# Patient Record
Sex: MALE | Race: WHITE | NOT HISPANIC OR LATINO | Employment: UNEMPLOYED | URBAN - METROPOLITAN AREA
[De-identification: names, ages, dates, MRNs, and addresses within clinical notes are randomized per-mention and may not be internally consistent; named-entity substitution may affect disease eponyms.]

---

## 2017-02-14 ENCOUNTER — ALLSCRIPTS OFFICE VISIT (OUTPATIENT)
Dept: OTHER | Facility: OTHER | Age: 82
End: 2017-02-14

## 2017-02-16 ENCOUNTER — ALLSCRIPTS OFFICE VISIT (OUTPATIENT)
Dept: OTHER | Facility: OTHER | Age: 82
End: 2017-02-16

## 2017-02-16 ENCOUNTER — GENERIC CONVERSION - ENCOUNTER (OUTPATIENT)
Dept: OTHER | Facility: OTHER | Age: 82
End: 2017-02-16

## 2017-03-16 ENCOUNTER — ALLSCRIPTS OFFICE VISIT (OUTPATIENT)
Dept: OTHER | Facility: OTHER | Age: 82
End: 2017-03-16

## 2017-03-16 DIAGNOSIS — R74.8 ABNORMAL LEVELS OF OTHER SERUM ENZYMES: ICD-10-CM

## 2017-03-16 DIAGNOSIS — B17.9 ACUTE VIRAL HEPATITIS: ICD-10-CM

## 2017-05-04 ENCOUNTER — ALLSCRIPTS OFFICE VISIT (OUTPATIENT)
Dept: OTHER | Facility: OTHER | Age: 82
End: 2017-05-04

## 2017-06-08 ENCOUNTER — ALLSCRIPTS OFFICE VISIT (OUTPATIENT)
Dept: OTHER | Facility: OTHER | Age: 82
End: 2017-06-08

## 2017-06-16 DIAGNOSIS — N18.4 CHRONIC KIDNEY DISEASE, STAGE IV (SEVERE) (HCC): ICD-10-CM

## 2017-06-20 ENCOUNTER — GENERIC CONVERSION - ENCOUNTER (OUTPATIENT)
Dept: OTHER | Facility: OTHER | Age: 82
End: 2017-06-20

## 2017-08-03 ENCOUNTER — ALLSCRIPTS OFFICE VISIT (OUTPATIENT)
Dept: OTHER | Facility: OTHER | Age: 82
End: 2017-08-03

## 2017-09-07 ENCOUNTER — ALLSCRIPTS OFFICE VISIT (OUTPATIENT)
Dept: OTHER | Facility: OTHER | Age: 82
End: 2017-09-07

## 2017-09-25 ENCOUNTER — HOSPITAL ENCOUNTER (INPATIENT)
Facility: HOSPITAL | Age: 82
LOS: 4 days | Discharge: NURSING, REHAB OR LONG-TERM CARE | DRG: 245 | End: 2017-09-30
Attending: EMERGENCY MEDICINE | Admitting: INTERNAL MEDICINE
Payer: MEDICARE

## 2017-09-25 ENCOUNTER — ALLSCRIPTS OFFICE VISIT (OUTPATIENT)
Dept: OTHER | Facility: OTHER | Age: 82
End: 2017-09-25

## 2017-09-25 ENCOUNTER — APPOINTMENT (EMERGENCY)
Dept: RADIOLOGY | Facility: HOSPITAL | Age: 82
DRG: 245 | End: 2017-09-25
Payer: MEDICARE

## 2017-09-25 DIAGNOSIS — I50.9 ACUTE EXACERBATION OF CHF (CONGESTIVE HEART FAILURE) (HCC): Primary | ICD-10-CM

## 2017-09-25 DIAGNOSIS — T82.111A: ICD-10-CM

## 2017-09-25 LAB
ALBUMIN SERPL BCP-MCNC: 2.9 G/DL (ref 3.5–5)
ALP SERPL-CCNC: 126 U/L (ref 46–116)
ALT SERPL W P-5'-P-CCNC: 73 U/L (ref 12–78)
ANION GAP SERPL CALCULATED.3IONS-SCNC: 9 MMOL/L (ref 4–13)
AST SERPL W P-5'-P-CCNC: 79 U/L (ref 5–45)
BASOPHILS # BLD AUTO: 0.03 THOUSANDS/ΜL (ref 0–0.1)
BASOPHILS NFR BLD AUTO: 1 % (ref 0–1)
BILIRUB SERPL-MCNC: 0.64 MG/DL (ref 0.2–1)
BUN SERPL-MCNC: 30 MG/DL (ref 5–25)
CALCIUM SERPL-MCNC: 9.4 MG/DL (ref 8.3–10.1)
CHLORIDE SERPL-SCNC: 108 MMOL/L (ref 100–108)
CO2 SERPL-SCNC: 22 MMOL/L (ref 21–32)
CREAT SERPL-MCNC: 1.51 MG/DL (ref 0.6–1.3)
EOSINOPHIL # BLD AUTO: 0.16 THOUSAND/ΜL (ref 0–0.61)
EOSINOPHIL NFR BLD AUTO: 3 % (ref 0–6)
ERYTHROCYTE [DISTWIDTH] IN BLOOD BY AUTOMATED COUNT: 14.5 % (ref 11.6–15.1)
GFR SERPL CREATININE-BSD FRML MDRD: 41 ML/MIN/1.73SQ M
GLUCOSE SERPL-MCNC: 94 MG/DL (ref 65–140)
HCT VFR BLD AUTO: 37.2 % (ref 36.5–49.3)
HGB BLD-MCNC: 12.2 G/DL (ref 12–17)
LYMPHOCYTES # BLD AUTO: 1.68 THOUSANDS/ΜL (ref 0.6–4.47)
LYMPHOCYTES NFR BLD AUTO: 31 % (ref 14–44)
MCH RBC QN AUTO: 32.5 PG (ref 26.8–34.3)
MCHC RBC AUTO-ENTMCNC: 32.8 G/DL (ref 31.4–37.4)
MCV RBC AUTO: 99 FL (ref 82–98)
MONOCYTES # BLD AUTO: 0.58 THOUSAND/ΜL (ref 0.17–1.22)
MONOCYTES NFR BLD AUTO: 11 % (ref 4–12)
NEUTROPHILS # BLD AUTO: 2.99 THOUSANDS/ΜL (ref 1.85–7.62)
NEUTS SEG NFR BLD AUTO: 54 % (ref 43–75)
NRBC BLD AUTO-RTO: 0 /100 WBCS
NT-PROBNP SERPL-MCNC: 1661 PG/ML
PLATELET # BLD AUTO: 253 THOUSANDS/UL (ref 149–390)
PMV BLD AUTO: 10.7 FL (ref 8.9–12.7)
POTASSIUM SERPL-SCNC: 4.6 MMOL/L (ref 3.5–5.3)
PROT SERPL-MCNC: 7.4 G/DL (ref 6.4–8.2)
RBC # BLD AUTO: 3.75 MILLION/UL (ref 3.88–5.62)
SODIUM SERPL-SCNC: 139 MMOL/L (ref 136–145)
TROPONIN I SERPL-MCNC: 0.02 NG/ML
WBC # BLD AUTO: 5.45 THOUSAND/UL (ref 4.31–10.16)

## 2017-09-25 PROCEDURE — 93005 ELECTROCARDIOGRAM TRACING: CPT | Performed by: EMERGENCY MEDICINE

## 2017-09-25 PROCEDURE — 96374 THER/PROPH/DIAG INJ IV PUSH: CPT

## 2017-09-25 PROCEDURE — 83880 ASSAY OF NATRIURETIC PEPTIDE: CPT | Performed by: EMERGENCY MEDICINE

## 2017-09-25 PROCEDURE — 84484 ASSAY OF TROPONIN QUANT: CPT | Performed by: EMERGENCY MEDICINE

## 2017-09-25 PROCEDURE — 36415 COLL VENOUS BLD VENIPUNCTURE: CPT

## 2017-09-25 PROCEDURE — 71020 HB CHEST X-RAY 2VW FRONTAL&LATL: CPT | Performed by: EMERGENCY MEDICINE

## 2017-09-25 PROCEDURE — 80053 COMPREHEN METABOLIC PANEL: CPT | Performed by: EMERGENCY MEDICINE

## 2017-09-25 PROCEDURE — 85025 COMPLETE CBC W/AUTO DIFF WBC: CPT | Performed by: EMERGENCY MEDICINE

## 2017-09-25 PROCEDURE — 99285 EMERGENCY DEPT VISIT HI MDM: CPT

## 2017-09-25 RX ORDER — ACETAMINOPHEN 325 MG/1
325 TABLET ORAL EVERY 4 HOURS PRN
Status: DISCONTINUED | OUTPATIENT
Start: 2017-09-25 | End: 2017-09-30 | Stop reason: HOSPADM

## 2017-09-25 RX ORDER — ATORVASTATIN CALCIUM 40 MG/1
40 TABLET, FILM COATED ORAL DAILY
Status: DISCONTINUED | OUTPATIENT
Start: 2017-09-26 | End: 2017-09-30 | Stop reason: HOSPADM

## 2017-09-25 RX ORDER — RAMIPRIL 5 MG/1
5 CAPSULE ORAL DAILY
Status: DISCONTINUED | OUTPATIENT
Start: 2017-09-26 | End: 2017-09-30 | Stop reason: HOSPADM

## 2017-09-25 RX ORDER — POTASSIUM CHLORIDE 750 MG/1
40 TABLET, EXTENDED RELEASE ORAL DAILY
Status: DISCONTINUED | OUTPATIENT
Start: 2017-09-26 | End: 2017-09-30 | Stop reason: HOSPADM

## 2017-09-25 RX ORDER — ALPRAZOLAM 0.5 MG/1
0.5 TABLET ORAL
Status: DISCONTINUED | OUTPATIENT
Start: 2017-09-26 | End: 2017-09-30 | Stop reason: HOSPADM

## 2017-09-25 RX ORDER — AMIODARONE HYDROCHLORIDE 200 MG/1
200 TABLET ORAL 2 TIMES DAILY
Status: DISCONTINUED | OUTPATIENT
Start: 2017-09-26 | End: 2017-09-30 | Stop reason: HOSPADM

## 2017-09-25 RX ORDER — ALBUTEROL SULFATE 2.5 MG/3ML
2.5 SOLUTION RESPIRATORY (INHALATION) EVERY 4 HOURS PRN
Status: DISCONTINUED | OUTPATIENT
Start: 2017-09-25 | End: 2017-09-26

## 2017-09-25 RX ORDER — ALLOPURINOL 100 MG/1
100 TABLET ORAL DAILY
Status: DISCONTINUED | OUTPATIENT
Start: 2017-09-26 | End: 2017-09-30 | Stop reason: HOSPADM

## 2017-09-25 RX ORDER — CARVEDILOL 3.12 MG/1
3.12 TABLET ORAL 2 TIMES DAILY WITH MEALS
Status: DISCONTINUED | OUTPATIENT
Start: 2017-09-26 | End: 2017-09-30 | Stop reason: HOSPADM

## 2017-09-25 RX ORDER — FUROSEMIDE 10 MG/ML
20 INJECTION INTRAMUSCULAR; INTRAVENOUS ONCE
Status: COMPLETED | OUTPATIENT
Start: 2017-09-25 | End: 2017-09-25

## 2017-09-25 RX ORDER — LANOLIN ALCOHOL/MO/W.PET/CERES
12 CREAM (GRAM) TOPICAL
Status: DISCONTINUED | OUTPATIENT
Start: 2017-09-26 | End: 2017-09-30 | Stop reason: HOSPADM

## 2017-09-25 RX ORDER — RANOLAZINE 500 MG/1
500 TABLET, EXTENDED RELEASE ORAL 2 TIMES DAILY
Status: DISCONTINUED | OUTPATIENT
Start: 2017-09-26 | End: 2017-09-30 | Stop reason: HOSPADM

## 2017-09-25 RX ORDER — FUROSEMIDE 10 MG/ML
40 INJECTION INTRAMUSCULAR; INTRAVENOUS
Status: DISCONTINUED | OUTPATIENT
Start: 2017-09-26 | End: 2017-09-30 | Stop reason: HOSPADM

## 2017-09-25 RX ORDER — ASPIRIN 81 MG/1
81 TABLET, CHEWABLE ORAL DAILY
Status: DISCONTINUED | OUTPATIENT
Start: 2017-09-26 | End: 2017-09-30 | Stop reason: HOSPADM

## 2017-09-25 RX ORDER — LEVOTHYROXINE SODIUM 0.05 MG/1
50 TABLET ORAL
Status: DISCONTINUED | OUTPATIENT
Start: 2017-09-26 | End: 2017-09-30 | Stop reason: HOSPADM

## 2017-09-25 RX ORDER — PANTOPRAZOLE SODIUM 20 MG/1
20 TABLET, DELAYED RELEASE ORAL
Status: DISCONTINUED | OUTPATIENT
Start: 2017-09-26 | End: 2017-09-30 | Stop reason: HOSPADM

## 2017-09-25 RX ADMIN — FUROSEMIDE 20 MG: 10 INJECTION, SOLUTION INTRAMUSCULAR; INTRAVENOUS at 21:56

## 2017-09-26 PROBLEM — N17.9 AKI (ACUTE KIDNEY INJURY) (HCC): Status: RESOLVED | Noted: 2017-09-26 | Resolved: 2017-09-26

## 2017-09-26 PROBLEM — N17.9 AKI (ACUTE KIDNEY INJURY) (HCC): Status: ACTIVE | Noted: 2017-09-26

## 2017-09-26 PROBLEM — I25.10 CAD (CORONARY ARTERY DISEASE): Status: ACTIVE | Noted: 2017-09-26

## 2017-09-26 PROBLEM — E78.5 HYPERLIPIDEMIA: Status: ACTIVE | Noted: 2017-09-26

## 2017-09-26 PROBLEM — E03.9 HYPOTHYROIDISM: Status: ACTIVE | Noted: 2017-09-26

## 2017-09-26 PROBLEM — I10 ESSENTIAL HYPERTENSION: Status: ACTIVE | Noted: 2017-09-26

## 2017-09-26 PROBLEM — I50.33 ACUTE ON CHRONIC DIASTOLIC (CONGESTIVE) HEART FAILURE (HCC): Status: ACTIVE | Noted: 2017-09-26

## 2017-09-26 PROBLEM — L89.151 DECUBITUS ULCER OF SACRAL REGION, STAGE 1: Status: ACTIVE | Noted: 2017-09-26

## 2017-09-26 PROBLEM — I48.0 PAROXYSMAL ATRIAL FIBRILLATION (HCC): Status: ACTIVE | Noted: 2017-09-26

## 2017-09-26 PROBLEM — I25.5 ISCHEMIC CARDIOMYOPATHY: Status: ACTIVE | Noted: 2017-09-26

## 2017-09-26 PROBLEM — I10 ESSENTIAL HYPERTENSION: Status: RESOLVED | Noted: 2017-09-26 | Resolved: 2017-09-26

## 2017-09-26 PROBLEM — N18.30 CHRONIC KIDNEY DISEASE (CKD), STAGE III (MODERATE) (HCC): Status: ACTIVE | Noted: 2017-09-26

## 2017-09-26 PROBLEM — N18.30 CHRONIC KIDNEY DISEASE (CKD), STAGE III (MODERATE) (HCC): Chronic | Status: ACTIVE | Noted: 2017-09-26

## 2017-09-26 LAB
ANION GAP SERPL CALCULATED.3IONS-SCNC: 8 MMOL/L (ref 4–13)
ATRIAL RATE: 31 BPM
ATRIAL RATE: 56 BPM
BUN SERPL-MCNC: 28 MG/DL (ref 5–25)
CALCIUM SERPL-MCNC: 9.2 MG/DL (ref 8.3–10.1)
CHLORIDE SERPL-SCNC: 105 MMOL/L (ref 100–108)
CO2 SERPL-SCNC: 26 MMOL/L (ref 21–32)
CREAT SERPL-MCNC: 1.44 MG/DL (ref 0.6–1.3)
ERYTHROCYTE [DISTWIDTH] IN BLOOD BY AUTOMATED COUNT: 14.2 % (ref 11.6–15.1)
GFR SERPL CREATININE-BSD FRML MDRD: 43 ML/MIN/1.73SQ M
GLUCOSE P FAST SERPL-MCNC: 91 MG/DL (ref 65–99)
GLUCOSE SERPL-MCNC: 91 MG/DL (ref 65–140)
HCT VFR BLD AUTO: 36.2 % (ref 36.5–49.3)
HGB BLD-MCNC: 11.7 G/DL (ref 12–17)
MAGNESIUM SERPL-MCNC: 2.3 MG/DL (ref 1.6–2.6)
MCH RBC QN AUTO: 31.8 PG (ref 26.8–34.3)
MCHC RBC AUTO-ENTMCNC: 32.3 G/DL (ref 31.4–37.4)
MCV RBC AUTO: 98 FL (ref 82–98)
P AXIS: -28 DEGREES
PHOSPHATE SERPL-MCNC: 2.7 MG/DL (ref 2.3–4.1)
PLATELET # BLD AUTO: 238 THOUSANDS/UL (ref 149–390)
PMV BLD AUTO: 10.9 FL (ref 8.9–12.7)
POTASSIUM SERPL-SCNC: 4.1 MMOL/L (ref 3.5–5.3)
PR INTERVAL: 106 MS
PR INTERVAL: 110 MS
QRS AXIS: 109 DEGREES
QRS AXIS: 195 DEGREES
QRSD INTERVAL: 52 MS
QRSD INTERVAL: 90 MS
QT INTERVAL: 470 MS
QT INTERVAL: 520 MS
QTC INTERVAL: 453 MS
QTC INTERVAL: 681 MS
RBC # BLD AUTO: 3.68 MILLION/UL (ref 3.88–5.62)
SODIUM SERPL-SCNC: 139 MMOL/L (ref 136–145)
T WAVE AXIS: -15 DEGREES
T WAVE AXIS: -29 DEGREES
TSH SERPL DL<=0.05 MIU/L-ACNC: 2.34 UIU/ML (ref 0.36–3.74)
VENTRICULAR RATE: 103 BPM
VENTRICULAR RATE: 56 BPM
WBC # BLD AUTO: 5.71 THOUSAND/UL (ref 4.31–10.16)

## 2017-09-26 PROCEDURE — 97167 OT EVAL HIGH COMPLEX 60 MIN: CPT

## 2017-09-26 PROCEDURE — G8978 MOBILITY CURRENT STATUS: HCPCS

## 2017-09-26 PROCEDURE — 97163 PT EVAL HIGH COMPLEX 45 MIN: CPT

## 2017-09-26 PROCEDURE — G8988 SELF CARE GOAL STATUS: HCPCS

## 2017-09-26 PROCEDURE — 80048 BASIC METABOLIC PNL TOTAL CA: CPT | Performed by: FAMILY MEDICINE

## 2017-09-26 PROCEDURE — 0JH608Z INSERTION OF DEFIBRILLATOR GENERATOR INTO CHEST SUBCUTANEOUS TISSUE AND FASCIA, OPEN APPROACH: ICD-10-PCS | Performed by: INTERNAL MEDICINE

## 2017-09-26 PROCEDURE — 85027 COMPLETE CBC AUTOMATED: CPT | Performed by: FAMILY MEDICINE

## 2017-09-26 PROCEDURE — 0JB60ZZ EXCISION OF CHEST SUBCUTANEOUS TISSUE AND FASCIA, OPEN APPROACH: ICD-10-PCS | Performed by: INTERNAL MEDICINE

## 2017-09-26 PROCEDURE — G8989 SELF CARE D/C STATUS: HCPCS

## 2017-09-26 PROCEDURE — 84443 ASSAY THYROID STIM HORMONE: CPT | Performed by: NURSE PRACTITIONER

## 2017-09-26 PROCEDURE — 94760 N-INVAS EAR/PLS OXIMETRY 1: CPT

## 2017-09-26 PROCEDURE — 84100 ASSAY OF PHOSPHORUS: CPT | Performed by: FAMILY MEDICINE

## 2017-09-26 PROCEDURE — G8979 MOBILITY GOAL STATUS: HCPCS

## 2017-09-26 PROCEDURE — 0JPT0PZ REMOVAL OF CARDIAC RHYTHM RELATED DEVICE FROM TRUNK SUBCUTANEOUS TISSUE AND FASCIA, OPEN APPROACH: ICD-10-PCS | Performed by: INTERNAL MEDICINE

## 2017-09-26 PROCEDURE — 3E0102A INTRODUCTION OF ANTI-INFECTIVE ENVELOPE INTO SUBCUTANEOUS TISSUE, OPEN APPROACH: ICD-10-PCS | Performed by: INTERNAL MEDICINE

## 2017-09-26 PROCEDURE — G8987 SELF CARE CURRENT STATUS: HCPCS

## 2017-09-26 PROCEDURE — 93005 ELECTROCARDIOGRAM TRACING: CPT | Performed by: FAMILY MEDICINE

## 2017-09-26 PROCEDURE — 83735 ASSAY OF MAGNESIUM: CPT | Performed by: FAMILY MEDICINE

## 2017-09-26 RX ADMIN — RANOLAZINE 500 MG: 500 TABLET, FILM COATED, EXTENDED RELEASE ORAL at 16:53

## 2017-09-26 RX ADMIN — FUROSEMIDE 40 MG: 10 INJECTION, SOLUTION INTRAMUSCULAR; INTRAVENOUS at 08:57

## 2017-09-26 RX ADMIN — RAMIPRIL 5 MG: 5 CAPSULE ORAL at 08:59

## 2017-09-26 RX ADMIN — FUROSEMIDE 40 MG: 10 INJECTION, SOLUTION INTRAMUSCULAR; INTRAVENOUS at 16:52

## 2017-09-26 RX ADMIN — ALPRAZOLAM 0.5 MG: 0.5 TABLET ORAL at 21:25

## 2017-09-26 RX ADMIN — ALLOPURINOL 100 MG: 100 TABLET ORAL at 08:58

## 2017-09-26 RX ADMIN — LEVOTHYROXINE SODIUM 50 MCG: 88 TABLET ORAL at 05:49

## 2017-09-26 RX ADMIN — CARVEDILOL 3.12 MG: 3.12 TABLET, FILM COATED ORAL at 16:53

## 2017-09-26 RX ADMIN — MELATONIN TAB 3 MG 12 MG: 3 TAB at 21:25

## 2017-09-26 RX ADMIN — POTASSIUM CHLORIDE 40 MEQ: 750 TABLET, EXTENDED RELEASE ORAL at 08:58

## 2017-09-26 RX ADMIN — ENOXAPARIN SODIUM 40 MG: 40 INJECTION SUBCUTANEOUS at 08:57

## 2017-09-26 RX ADMIN — ASPIRIN 81 MG 81 MG: 81 TABLET ORAL at 08:58

## 2017-09-26 RX ADMIN — AMIODARONE HYDROCHLORIDE 200 MG: 200 TABLET ORAL at 08:58

## 2017-09-26 RX ADMIN — PANTOPRAZOLE SODIUM 20 MG: 20 TABLET, DELAYED RELEASE ORAL at 05:49

## 2017-09-26 RX ADMIN — AMIODARONE HYDROCHLORIDE 200 MG: 200 TABLET ORAL at 16:52

## 2017-09-26 RX ADMIN — CARVEDILOL 3.12 MG: 3.12 TABLET, FILM COATED ORAL at 08:58

## 2017-09-26 RX ADMIN — ATORVASTATIN CALCIUM 40 MG: 40 TABLET, FILM COATED ORAL at 08:58

## 2017-09-26 RX ADMIN — RANOLAZINE 500 MG: 500 TABLET, FILM COATED, EXTENDED RELEASE ORAL at 08:59

## 2017-09-26 NOTE — CONSULTS
Consultation - Cardiology   Maria G Alvarenga 80 y o  male MRN: [id number]  Unit/Bed#: [facility] 709-01 Encounter: [id number]    Assessment/Plan   Acute on chronic CHF with combined systolic and diastolic dysfunction  Daily weights, strict intake and output, will diurese with Lasix 40 mg IV q 12 hours as the patient is on torsemide 10 mg at home per the record  He is not in any acute respiratory distress at this time but does appear fluid overloaded  1800 mL fluid restriction, no added salt diet with sodium less than 1500 mg  No need for further echocardiogram as patient had 1 about 1 year ago and he has no change in clinical status    Coronary artery disease status post MI  No chest pain, troponin negative  Continue atorvastatin 40 mg daily  Continue Coreg 3 125 mg q 12  Continue ramipril 5 mg daily  Continue on aspirin 81 mg  On ranolazine 500 mg b i d  Ischemic cardiomyopathy with biventricular pacer/ICD  Sent for generator change, will consult electrophysiology for this  EMILY August 15, 2017    Paroxysmal AFib not on anticoagulation  Patient with recent fall and deemed high fall risk therefore not on anticoagulation  He is on amiodarone 200 mg q 12 hours for rhythm control  Franko Vasc score is 4      History of Present Illness   Physician Requesting Consult: Eddie Shirley MD  Reason for Consult / Principal Problem: heart failure   HPI: Maria G Alvarenga is a 80y o  year old male with a history of coronary artery disease status post Mi, ischemic cardiomyopathy most recent echo with an EF of 55% in 2016, biventricular ICD dependent, and paroxysmal atrial fibrillation not on anticoagulation due to high fall risk with recent injury,  who presents with acute exacerbation of heart failure  PT was seen at his cardiologist's Dr Gertrude Miller office on day of presentation, was seen for a cough ongoing for 1 month and appeared in fluid overload and was sent to the emergency department for IV diuresis    Also noted that he is pacer dependent and ICD [employer] is at [facility] since August 15 2017, and was sent for generator replacement  The patient's primary complaint is a cough that has been going on for 1 month  He denies any paroxysmal nocturnal dyspnea, denies orthopnea for  He denies any dyspnea or exertional dyspnea  He generally walks with assistance at his nursing facility and does not report a problem with this  He has no chest pain  The patient has a history of 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 with an echo in 2015 revealing an EF of 40%  However most recent echo from December 2016 reveals an ejection fraction of 55% and no identified wall motion abnormalities or diastolic dysfunction though could not be ruled out based on difficult study  Inpatient consult to Cardiology  Date/Time: 9/26/2017 8:26 AM  Performed by: Roel Vega by: Olga Peterson           Review of Systems Review of Systems  Constitutional: No fever, no fatigue  ENT: cold sx  Respiratory: denies difficulty breathing, +cough   Cardiovascular: no chest pain  Abdomen: no vomiting, denies abdominal pain  MSK: no trauma, no deformity   : no problems urinating   Neuro: no focal weakness, no headache   Skin: negative for any new significant rash   A 10 system review was performed and is otherwise negative except as mentioned above  Historical Information   Past Medical History:   Diagnosis Date    Acquired hypothyroidism 12/28/2016    Anxiety     Atrial fibrillation     Cardiac disease     CHF (congestive heart failure)     Disease of thyroid gland     Gout     Hyperlipidemia     Paroxysmal atrial fibrillation 12/28/2016     History reviewed  No pertinent surgical history  History   Alcohol Use No     History   Drug Use No     History   Smoking Status    Never Smoker   Smokeless Tobacco    Never Used     Family History: History reviewed  No pertinent family history      Meds/Allergies   current meds:   Current Facility-Administered Medications   Medication Dose Route Frequency    acetaminophen (TYLENOL) tablet 325 mg  325 mg Oral Q4H PRN    allopurinol (ZYLOPRIM) tablet 100 mg  100 mg Oral Daily    ALPRAZolam (XANAX) tablet 0 5 mg  0 5 mg Oral HS    amiodarone tablet 200 mg  200 mg Oral BID    aspirin chewable tablet 81 mg  81 mg Oral Daily    atorvastatin (LIPITOR) tablet 40 mg  40 mg Oral Daily    carvedilol (COREG) tablet 3 125 mg  3 125 mg Oral BID With Meals    enoxaparin (LOVENOX) subcutaneous injection 40 mg  40 mg Subcutaneous Daily    furosemide (LASIX) injection 40 mg  40 mg Intravenous BID (diuretic)    levothyroxine tablet 50 mcg  50 mcg Oral Early Morning    melatonin tablet 12 mg  12 mg Oral HS    pantoprazole (PROTONIX) EC tablet 20 mg  20 mg Oral Early Morning    potassium chloride (K-DUR,KLOR-CON) CR tablet 40 mEq  40 mEq Oral Daily    ramipril (ALTACE) capsule 5 mg  5 mg Oral Daily    ranolazine (RANEXA) 12 hr tablet 500 mg  500 mg Oral BID     Allergies   Allergen Reactions    Plavix [Clopidogrel Bisulfate]        Objective   Vitals: Blood pressure 113/58, pulse (!) 54, temperature 98 °F (36 7 °C), temperature source Oral, resp  rate 18, height 6' (1 829 m), weight 111 kg (243 lb 13 3 oz), SpO2 100 %    Orthostatic Blood Pressures    Flowsheet Row Most Recent Value   Blood Pressure  113/58 filed at 09/26/2017 3645   Patient Position - Orthostatic VS  Lying filed at 09/26/2017 0012            Intake/Output Summary (Last 24 hours) at 09/26/17 0826  Last data filed at 09/26/17 0540   Gross per 24 hour   Intake                0 ml   Output             1611 ml   Net            -1611 ml       Invasive Devices     Peripheral Intravenous Line            Peripheral IV 09/25/17 Left Forearm less than 1 day                Physical Exam   General Appearance: awake  alert, no acute distress, elderly male  Eyes: normal conjunctivae, PERRL   Neck: supple, no sig JVD  Lungs: mild crackles bilat, no resp distress or increased work of breathing  Heart: anastasiya, pacer in place, S1, S2 click   Abdomen: soft, non-tender, non-distended; bowel sounds normal;   Extremities: no deformity, +2 pitting edema up to the mid tibia bilat  Neuro: no focal deficits, speech fluent   Skin: warm, dry, not diaphoretic       Lab Results:   CBC with diff:   Results from last 7 days  Lab Units 09/26/17  0618   WBC Thousand/uL 5 71   RBC Million/uL 3 68*   HEMOGLOBIN g/dL 11 7*   HEMATOCRIT % 36 2*   MCV fL 98   MCH pg 31 8   MCHC g/dL 32 3   RDW % 14 2   MPV fL 10 9   PLATELETS Thousands/uL 238     CMP:   Results from last 7 days  Lab Units 09/26/17  0618 09/25/17  1735   SODIUM mmol/L 139 139   POTASSIUM mmol/L 4 1 4 6   CHLORIDE mmol/L 105 108   CO2 mmol/L 26 22   ANION GAP mmol/L 8 9   BUN mg/dL 28* 30*   CREATININE mg/dL 1 44* 1 51*   GLUCOSE RANDOM mg/dL 91 94   CALCIUM mg/dL 9 2 9 4   AST U/L  --  79*   ALT U/L  --  73   ALK PHOS U/L  --  126*   TOTAL PROTEIN g/dL  --  7 4   ALBUMIN g/dL  --  2 9*   BILIRUBIN TOTAL mg/dL  --  0 64   EGFR ml/min/1 73sq m 43 41     Troponin:   0  Lab Value Date/Time   TROPONINI 0 02 09/25/2017 1735   TROPONINI 0 03 12/27/2016 2338   TROPONINI 0 04 (H) 12/27/2016 1922     BNP:   Results from last 7 days  Lab Units 09/26/17  0618   SODIUM mmol/L 139   POTASSIUM mmol/L 4 1   CHLORIDE mmol/L 105   CO2 mmol/L 26   ANION GAP mmol/L 8   BUN mg/dL 28*   CREATININE mg/dL 1 44*   GLUCOSE RANDOM mg/dL 91   CALCIUM mg/dL 9 2   EGFR ml/min/1 73sq m 43     Magnesium:   Results from last 7 days  Lab Units 09/26/17  0618   MAGNESIUM mg/dL 2 3     Imaging: I have personally reviewed pertinent reports  EKG: biventricular paced rhythm sa and AV   VTE Prophylaxis: Enoxaparin (Lovenox)    Code Status: Level 1 - Full Code    Counseling / Coordination of Care  Total floor / unit time spent today 60 minutes    Greater than 50% of total time was spent with the patient and / or family counseling and / or coordination of care    A description of the counseling / coordination of care: inteviewing pt and discussion with other members of the health care team

## 2017-09-26 NOTE — H&P
History and Physical - [facility] Internal Medicine    Patient Information: Maria G Reeder 80 y o  male MRN: [id number]  Unit/Bed#: [facility] 709-01 Encounter: [id number]  Admitting Physician: Lizz Iverson MD  PCP: Kelli Norris MD  Date of Admission:  09/26/17    Assessment/Plan:    Hospital Problem List:     Principal Problem:    Acute on chronic diastolic (congestive) heart failure  Active Problems:    Chronic kidney disease (CKD), stage III (moderate)      Plan for the Primary Problem(s):  · Acute on chronic diastolic heart failure  · Patient clinically appears volume overloaded  · Will start patient on 40 mg of IV Lasix twice daily  · Cardiology consult will follow up on their recommendations  · Monitor I's and O's  May need to increase Lasix depending on volume status  · Monitor on telemetry  Plan for Additional Problems:   · Chronic kidney disease stage 3:  Patient's creatinine at this time is better than baseline  Will expect an increase with diuretic use  Monitor creatinine  · All other chronic conditions are stable at this time and home medications will be continued  VTE Prophylaxis: Enoxaparin (Lovenox)  / sequential compression device   Code Status: Full    Anticipated Length of Stay:  Patient will be admitted on an Observation basis with an anticipated length of stay of  less than 2 midnights  Justification for Hospital Stay:  Acute on chronic diastolic heart failure      Chief Complaint:   Shortness of breath    History of Present Illness:    Maria G Reeder is a 80 y o  male who presents with shortness of breath for about a month  He was seen at his cardiologist's office today for a possible pacemaker battery change  His cardiologist did notice that he had increased lower extremity swelling and shortness of breath associated and advised the patient that he should go to the emergency room  The patient denies any fevers or chills      Review of Systems:    Review of Systems   Constitutional: Negative for chills and fever  HENT: Negative  Respiratory: Positive for cough and shortness of breath  Cardiovascular: Positive for leg swelling  Negative for chest pain and palpitations  Gastrointestinal: Negative for nausea and vomiting  Neurological: Negative for dizziness and light-headedness  All other systems reviewed and are negative  Past Medical and Surgical History:     Past Medical History:   Diagnosis Date    Acquired hypothyroidism 12/28/2016    Anxiety     Atrial fibrillation     Cardiac disease     CHF (congestive heart failure)     Disease of thyroid gland     Gout     Hyperlipidemia     Paroxysmal atrial fibrillation 12/28/2016       History reviewed  No pertinent surgical history  Meds/Allergies:    Prior to Admission medications    Medication Sig Start Date End Date Taking? Authorizing Provider   acetaminophen (TYLENOL) 325 mg tablet Take 325 mg by mouth every 4 (four) hours as needed for mild pain  Yes Historical Provider, MD   albuterol (2 5 mg/3 mL) 0 083 % nebulizer solution Take 2 5 mg by nebulization every 4 (four) hours as needed for wheezing   Yes Historical Provider, MD   allopurinol (ZYLOPRIM) 100 mg tablet Take 100 mg by mouth daily  Yes Historical Provider, MD   ALPRAZolam Buddie Porter) 0 5 mg tablet Take 0 5 mg by mouth daily at bedtime  Yes Historical Provider, MD   aluminum-magnesium hydroxide-simethicone (MYLANTA) 200-200-20 mg/5 mL suspension Take 15 mL by mouth daily as needed for indigestion or heartburn  Yes Historical Provider, MD   amiodarone 200 mg tablet Take 200 mg by mouth 2 (two) times a day  Yes Historical Provider, MD   aspirin 81 MG tablet Take 81 mg by mouth daily  Yes Historical Provider, MD   atorvastatin (LIPITOR) 40 mg tablet Take 40 mg by mouth daily  Yes Historical Provider, MD   carvedilol (COREG) 3 125 mg tablet Take 3 125 mg by mouth 2 (two) times a day with meals     Yes Historical Provider, MD   Dextromethorphan-Guaifenesin (ROBITUSSIN DM PO) Take 10 mL by mouth every 4 (four) hours as needed  Yes Historical Provider, MD   furosemide (LASIX) 40 mg tablet Take 2 tablets by mouth daily for 30 days 60 mg in am, 40 mg in pm 12/31/16 9/25/17 Yes Eriberto Schuster MD   furosemide (LASIX) 80 mg tablet Take 80 mg by mouth daily   Yes Historical Provider, MD   levothyroxine 50 mcg tablet Take 50 mcg by mouth daily  Yes Historical Provider, MD   Melatonin 10 MG CAPS Take 10 mg by mouth daily at bedtime  Yes Historical Provider, MD   metolazone (ZAROXOLYN) 2 5 mg tablet Take 2 5 mg by mouth 2 (two) times a day   Yes Historical Provider, MD   Multiple Vitamins-Minerals (THERA-M PO) Take 1 tablet by mouth daily  Yes Historical Provider, MD   nitroglycerin (NITROSTAT) 0 4 mg SL tablet Place 0 4 mg under the tongue every 5 (five) minutes as needed for chest pain  Yes Historical Provider, MD   omeprazole (PriLOSEC) 20 mg delayed release capsule Take 20 mg by mouth daily  Yes Historical Provider, MD   potassium chloride 20 MEQ TBCR Take 2 tablets by mouth daily for 30 days 12/30/16 9/25/17 Yes Rock Soha PA-C   ramipril (ALTACE) 5 mg capsule Take 1 capsule by mouth daily for 30 days 12/31/16 9/25/17 Yes Rock Soha PA-C   ranolazine (RANEXA) 500 mg 12 hr tablet Take 500 mg by mouth 2 (two) times a day  Yes Historical Provider, MD     I have reviewed home medications with patient personally  Allergies: Allergies   Allergen Reactions    Plavix [Clopidogrel Bisulfate]        Social History:     Marital Status: Single     Substance Use History:   History   Alcohol Use No     History   Smoking Status    Never Smoker   Smokeless Tobacco    Never Used     History   Drug Use No       Family History:    History reviewed  No pertinent family history      Physical Exam:     Vitals:   Blood Pressure: 146/62 (09/26/17 0100)  Pulse: (!) 52 (09/26/17 0037)  Temperature: 97 8 °F (36 6 °C) (09/26/17 0037)  Temp Source: Oral (09/26/17 0037)  Respirations: 18 (09/26/17 0037)  Height: 6' (182 9 cm) (09/26/17 0037)  Weight - Scale: 111 kg (243 lb 13 3 oz) (09/26/17 0037)  SpO2: 94 % (09/26/17 0134)    Physical Exam   Constitutional: He is oriented to person, place, and time  He appears well-developed and well-nourished  HENT:   Head: Normocephalic and atraumatic  Eyes: Conjunctivae are normal  Pupils are equal, round, and reactive to light  Neck: Normal range of motion  Neck supple  Cardiovascular: Normal rate, regular rhythm, normal heart sounds and intact distal pulses  Pulmonary/Chest: Effort normal    Diffuse expiratory wheezing   Abdominal: Soft  Bowel sounds are normal  He exhibits no distension  There is no tenderness  Musculoskeletal: Normal range of motion  He exhibits edema  1+ bilateral lower extremity edema   Neurological: He is alert and oriented to person, place, and time  Skin: Skin is warm and dry  Psychiatric: He has a normal mood and affect  His behavior is normal  Judgment and thought content normal        Additional Data:     Lab Results: I have personally reviewed pertinent reports        Results for orders placed or performed during the hospital encounter of 09/25/17   Comprehensive metabolic panel   Result Value Ref Range    Sodium 139 136 - 145 mmol/L    Potassium 4 6 3 5 - 5 3 mmol/L    Chloride 108 100 - 108 mmol/L    CO2 22 21 - 32 mmol/L    Anion Gap 9 4 - 13 mmol/L    BUN 30 (H) 5 - 25 mg/dL    Creatinine 1 51 (H) 0 60 - 1 30 mg/dL    Glucose 94 65 - 140 mg/dL    Calcium 9 4 8 3 - 10 1 mg/dL    AST 79 (H) 5 - 45 U/L    ALT 73 12 - 78 U/L    Alkaline Phosphatase 126 (H) 46 - 116 U/L    Total Protein 7 4 6 4 - 8 2 g/dL    Albumin 2 9 (L) 3 5 - 5 0 g/dL    Total Bilirubin 0 64 0 20 - 1 00 mg/dL    eGFR 41 ml/min/1 73sq m   CBC and differential   Result Value Ref Range    WBC 5 45 4 31 - 10 16 Thousand/uL    RBC 3 75 (L) 3 88 - 5 62 Million/uL    Hemoglobin 12 2 12 0 - 17 0 g/dL    Hematocrit 37 2 36 5 - 49 3 %    MCV 99 (H) 82 - 98 fL    MCH 32 5 26 8 - 34 3 pg    MCHC 32 8 31 4 - 37 4 g/dL    RDW 14 5 11 6 - 15 1 %    MPV 10 7 8 9 - 12 7 fL    Platelets 062 883 - 617 Thousands/uL    nRBC 0 /100 WBCs    Neutrophils Relative 54 43 - 75 %    Lymphocytes Relative 31 14 - 44 %    Monocytes Relative 11 4 - 12 %    Eosinophils Relative 3 0 - 6 %    Basophils Relative 1 0 - 1 %    Neutrophils Absolute 2 99 1 85 - 7 62 Thousands/µL    Lymphocytes Absolute 1 68 0 60 - 4 47 Thousands/µL    Monocytes Absolute 0 58 0 17 - 1 22 Thousand/µL    Eosinophils Absolute 0 16 0 00 - 0 61 Thousand/µL    Basophils Absolute 0 03 0 00 - 0 10 Thousands/µL   B-type natriuretic peptide   Result Value Ref Range    NT-proBNP 1,661 (H) <450 pg/mL   Troponin I   Result Value Ref Range    Troponin I 0 02 <=0 04 ng/mL         Imaging: I have personally reviewed pertinent reports  No results found  [facility] [employer] (formerly known as [employer]) Records Reviewed: Yes     ** Please Note: This note has been constructed using a voice recognition system   **

## 2017-09-26 NOTE — PLAN OF CARE
Problem: PHYSICAL THERAPY ADULT  Goal: Performs mobility at highest level of function for planned discharge setting  See evaluation for individualized goals  Treatment/Interventions: Functional transfer training, LE strengthening/ROM, Therapeutic exercise, Endurance training, Bed mobility, Gait training          See flowsheet documentation for full assessment, interventions and recommendations  Prognosis: Fair  Problem List: Decreased strength, Decreased endurance, Decreased mobility, Impaired balance  Assessment: Pt is an 79 y/o male who was admitted for acute on chronic CHF  Pt was told to come to ED after being at cardiologists office as he noted JVD and increased peripheral edema  Pt has a history of CKD stage 3, GERD, hypothyroidism, HLD, OA, CAD, CHF, and a-fib  PTA, pt resided in a nursing home and required assistance for ADLs and was ambulating with Ax2 and cane as per pt  During IE, pt required mod Ax1 to perform bed mobility and max Ax2 to perform transfers  Upon standing, pt was pushing posteriorly and was unable to hold himself up  Pt would benefit from inpatient physical therapy to improve strength, balance, and endurance  Recommend rehab vs  return to [address] [place] [address] upon d/c  If pt is resident at [facility] and typically uses [facility], will d/c PT  If pt does not reside at [facility] part [facility], pt will need rehab upon d/c  Recommendation: Short-term skilled PT (vs return to [facility])     PT - OK to Discharge:  (when medically stable )    See flowsheet documentation for full assessment

## 2017-09-26 NOTE — OCCUPATIONAL THERAPY NOTE
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    Acute kidney injury on Chronic kidney disease (CKD), stage III (moderate)    CAD (coronary artery disease)     Ischemic cardiomyopathy status post ICD    Paroxysmal atrial fibrillation    Hyperlipidemia    Decubitus ulcer of sacral region, stage 1    Hypothyroidism     Past Medical History  Past Medical History:   Diagnosis Date    Acquired hypothyroidism 12/28/2016    Anxiety     Atrial fibrillation     Cardiac disease     CHF (congestive heart failure)     Disease of thyroid gland     Gout     Hyperlipidemia     Paroxysmal atrial fibrillation 12/28/2016     Past Surgical History  History reviewed  No pertinent surgical history  09/26/17 1116   Note Type   Note type Eval only   Restrictions/Precautions   Weight Bearing Precautions Per Order No   Other Precautions Bed Alarm; Chair Alarm;Cognitive; Impulsive; Fall Risk;Telemetry;Multiple lines   Pain Assessment   Pain Assessment No/denies pain   Pain Score No Pain   Home Living   Type of Home SNF   Prior Function   Level of [place] Needs assistance with ADLs and functional mobility   Lives With Facility staff   Receives Help From Personal care attendant   ADL Assistance Needs assistance   IADLs Needs assistance   Falls in the last 6 months 0   Vocational Retired   Lifestyle   Autonomy Pt was max A for ADLs/self care, nonambulatory w/c bound and required kourtney lift for dependent OOB transfe, pt is a resident of [facility] skilled unit (unknown upon OT eval, PLOF/social hx obtained later per CM from facility)     Reciprocal Relationships Supportive staff of the [facility] is primary contact and friend  Service to Others Retired from [employer] in [facility] [address] [facility] also was a msgnr for [employer]  Intrinsic Gratification Enjoys spending time going to churches at hospitals, reading scriptures, praying  Psychosocial   Psychosocial (WDL) WDL   Subjective   Subjective "I want to try"  ADL   Eating Assistance 2  Maximal Assistance   Grooming Assistance 2  Maximal Assistance   UB Bathing Assistance 2  Maximal Assistance   LB Bathing Assistance 2  Maximal Assistance   UB Dressing Assistance 2  Maximal Assistance   LB Dressing Assistance 2  Maximal Assistance   Toileting Assistance  2  Maximal Assistance   Functional Assistance 2  Maximal Assistance   Bed Mobility   Rolling R 2  Maximal assistance   Rolling L 2  Maximal assistance   Supine to Sit 2  Maximal assistance   Sit to Supine 2  Maximal assistance   Transfers   Sit to Stand 1  Dependent   Functional Mobility   Functional Mobility 1  Total assistance   Balance   Static Sitting Poor   Dynamic Sitting Poor   Static Standing Poor   Dynamic Standing Poor   Ambulatory Poor   Activity Tolerance   Activity Tolerance Patient limited by fatigue   RUE Assessment   RUE Assessment WFL   LUE Assessment   LUE Assessment WFL   Hand Function   Gross Motor Coordination Functional   Fine Motor Coordination Functional   Sensation   Light Touch No apparent deficits   Proprioception   Proprioception No apparent deficits   Vision-Basic Assessment   Current Vision No visual deficits   Vision - Complex Assessment   Ocular Range of Motion WFL   Perception   Inattention/Neglect Appears intact   Cognition   Overall Cognitive Status Impaired   Arousal/Participation Alert   Attention Attends with cues to redirect   Orientation Level Oriented to person;Disoriented to place; Disoriented to time;Disoriented to situation   Memory Decreased short term memory;Decreased recall of recent events;Decreased recall of precautions   Following Commands Follows one step commands with increased time or repetition   Assessment   Limitation Decreased ADL status; Decreased Safe judgement during ADL;Decreased cognition;Visual deficit; Decreased fine motor control;Decreased high-level ADLs; Decreased self-care trans;Decreased endurance;Decreased sensation;Decreased UE ROM; Decreased UE strength   Prognosis Poor   Assessment Pt is confused, retired 79 y/o male seen for OT eval s/p adm to [facility] w/ SOB for a month, at cardiologists office for a possible pacemaker battery change, LE swelling and SOB, dx'd w/ SOB, CKDIII, CHF, comorbidities include a h/o acquired hypothyroidism, anxiety, Afib, CHF, gout, HLD, Afib  Pt was max A for ADLs/self care, nonambulatory w/c bound and required kourtney lift for dependent OOB transfe, pt is a resident of [address] [place] [address] (unknown upon OT eval, PLOF/social hx obtained later per CM from facility)  Pt is currently demonstrating the following occupational deficits: limited 2* decreased endurance/activity tolerance, generalized weakness, deconditioning, SOB, MICHELE, fatigue, fall risk, impaired balance, confused, disoriented, impulsive, distractible, poor problem solving and sequencing, poor safety insight judgment and awareness into deficits, poor attention/concentration to task, requiring max cues to redirect and additional time for delayed processing, large body habitus, rigidity, limited fx'l LB reaching  The following Occupational Performance Areas to address include: eating, grooming, bathing/shower, toilet hygiene, dressing, medication management, socialization, health maintenance, functional mobility, community mobility, clothing management, cleaning, meal prep, money management, household maintenance, care of children, care of pets, job performance/volunteering and social participation   Pt requires overall max A for ADLs/self care and max A x2 for EOB to stance trial unknowning t/o initial eval pt was a kourtney lift, social hx obtained later per CM for this note  Pt scored overall 5/100 on the Barthel Index and 5/6 on the Modified Mancos Scale  Based on the aforementioned OT evaluation, functional performance deficits, and assessments, pt has been identified as a high complexity evaluation  Recommend return to [facility] skilled unit when med stable  Skilled acute immediate OT services not indicated for pt at this time as anticipate from OT standpoint, pt is functioning near baseline  No further acute OT needs indicated, D/C OT  Spoke w/ CM/PT  Recommend d/c to previous environment when medically stable  Recommendation   Discharge Recommendation Short Term Rehab  (to return to [facility] skilled unit)   OT - OK to Discharge Yes  (to return to [facility] skilled unit)   Barthel Index   Feeding 0   Bathing 0   Grooming Score 0   Dressing Score 0   Bladder Score 0   Bowels Score 0   Toilet Use Score 0   Transfers (Bed/Chair) Score 5   Mobility (Level Surface) Score 0   Stairs Score 0   Barthel Index Score 5   Modified Brenda Scale   Modified Mancos Scale 5   Thank you for the consult!  Please call if you have any questions: Zhanna Tatum, OTNANCY, OTR/L, C-GCM, CSRS  [facility]

## 2017-09-26 NOTE — SUBJECTIVE & OBJECTIVE
VTE Pharmacologic Prophylaxis:   Pharmacologic: Enoxaparin (Lovenox)  Mechanical VTE Prophylaxis in Place: No    Patient Centered Rounds: I have performed bedside rounds with nursing staff today  Discussions with Specialists or Other Care Team Provider:  Cardiology    Education and Discussions with Family / Patient:  Patient    Time Spent for Care: 30 minutes  More than 50% of total time spent on counseling and coordination of care as described above  Current Length of Stay: 0 day(s)    Current Patient Status: Inpatient   Certification Statement: The patient, admitted on an observation basis, will now require > 2 midnight hospital stay due to Will require inpatient status secondary to IV diuretics for management of acute on chronic combo congestive heart failure    Discharge Plan:  Return to [facility] when medically stable and cleared from a cardiac standpoint    Code Status: Level 1 - Full Code      Subjective:   Patient denies shortness of breath although he appears short of breath with conversation and continues with cough throughout the conversation  Cough is nonproductive  States his cough is secondary to a month long cold  He denies orthopnea  He does not report any headache, dizziness  No nausea or vomiting  Tolerating diet  Objective:     Vitals:   Temp (24hrs), Av 1 °F (36 7 °C), Min:97 8 °F (36 6 °C), Max:98 5 °F (36 9 °C)    HR:  [52-62] 55  Resp:  [18-20] 18  BP: (104-183)/(53-96) 104/53  SpO2:  [90 %-100 %] 96 %  Body mass index is 33 07 kg/m²  Input and Output Summary (last 24 hours): Intake/Output Summary (Last 24 hours) at 17 1621  Last data filed at 17 1414   Gross per 24 hour   Intake              540 ml   Output             2336 ml   Net            -1796 ml       Physical Exam:     Physical Exam   Constitutional: He is oriented to person, place, and time  He appears well-developed and well-nourished     Mild shortness of breath with conversation and a faint expiratory wheeze noted   HENT:   Head: Normocephalic  Neck: Neck supple  Cardiovascular: Regular rhythm  Bradycardia present  Murmur heard  Pulmonary/Chest: Effort normal  No respiratory distress  He has decreased breath sounds  Exertional wheezes noted anterior chest   Abdominal: Soft  Bowel sounds are normal  He exhibits no distension  There is no tenderness  Obese   Musculoskeletal: Normal range of motion  He exhibits edema  +1 pitting pedal ankle edema right greater than left   Neurological: He is alert and oriented to person, place, and time  Skin: Skin is warm and dry  Psychiatric: He has a normal mood and affect  His behavior is normal    Vitals reviewed  Additional Data:     Labs:      Results from last 7 days  Lab Units 09/26/17  0618 09/25/17  1735   WBC Thousand/uL 5 71 5 45   HEMOGLOBIN g/dL 11 7* 12 2   HEMATOCRIT % 36 2* 37 2   PLATELETS Thousands/uL 238 253   NEUTROS PCT %  --  54   LYMPHS PCT %  --  31   MONOS PCT %  --  11   EOS PCT %  --  3       Results from last 7 days  Lab Units 09/26/17  0618 09/25/17  1735   SODIUM mmol/L 139 139   POTASSIUM mmol/L 4 1 4 6   CHLORIDE mmol/L 105 108   CO2 mmol/L 26 22   BUN mg/dL 28* 30*   CREATININE mg/dL 1 44* 1 51*   CALCIUM mg/dL 9 2 9 4   TOTAL PROTEIN g/dL  --  7 4   BILIRUBIN TOTAL mg/dL  --  0 64   ALK PHOS U/L  --  126*   ALT U/L  --  73   AST U/L  --  79*   GLUCOSE RANDOM mg/dL 91 94           * I Have Reviewed All Lab Data Listed Above  * Additional Pertinent Lab Tests Reviewed:  Li 66 Admission Reviewed    Imaging:    Imaging Reports Reviewed Today Include: CXR  Imaging Personally Reviewed by Myself Includes:  CXR    Recent Cultures (last 7 days):           Last 24 Hours Medication List:     allopurinol 100 mg Oral Daily   ALPRAZolam 0 5 mg Oral HS   amiodarone 200 mg Oral BID   aspirin 81 mg Oral Daily   atorvastatin 40 mg Oral Daily   carvedilol 3 125 mg Oral BID With Meals   enoxaparin 40 mg Subcutaneous Daily   furosemide 40 mg Intravenous BID (diuretic)   levothyroxine 50 mcg Oral Early Morning   melatonin 12 mg Oral HS   pantoprazole 20 mg Oral Early Morning   potassium chloride 40 mEq Oral Daily   ramipril 5 mg Oral Daily   ranolazine 500 mg Oral BID        Today, Patient Was Seen By: PATTIE Bond    ** Please Note: Dragon 360 Dictation voice to text software may have been used in the creation of this document   **

## 2017-09-26 NOTE — PLAN OF CARE
CARDIOVASCULAR - ADULT     Maintains optimal cardiac output and hemodynamic stability Progressing     Absence of cardiac dysrhythmias or at baseline rhythm Progressing        Potential for Falls     Patient will remain free of falls Progressing        Prexisting or High Potential for Compromised Skin Integrity     Skin integrity is maintained or improved Progressing        RESPIRATORY - ADULT     Achieves optimal ventilation and oxygenation Progressing

## 2017-09-26 NOTE — ASSESSMENT & PLAN NOTE
· Present on admission as evidenced by edema, persisting cough, JVD noted by cardiologist during an outpatient visit to have ICD battery updated  · Chest x-ray with mild pulmonary vascular congestion, BNP 1661  · Cardiology following  · Continue beta-blocker  · Plan to continue IV diuresis  · Continue daily weights (per documentation 7 lb weight loss noted unclear if this is accurate will continue to monitor daily)  · Continue I & O

## 2017-09-26 NOTE — PHYSICAL THERAPY NOTE
Physical Therapy Evaluation    Patient's Name:Maria G Alvarenga    [id number] Date:09/26/17    Patient Active Problem List   Diagnosis    Cognitive impairment    [place] (hard of hearing)    Ambulatory dysfunction    Hypotension    Acquired hypothyroidism    Paroxysmal atrial fibrillation    Acute on chronic diastolic (congestive) heart failure    Chronic kidney disease (CKD), stage III (moderate)       Past Medical History:   Diagnosis Date    Acquired hypothyroidism 12/28/2016    Anxiety     Atrial fibrillation     Cardiac disease     CHF (congestive heart failure)     Disease of thyroid gland     Gout     Hyperlipidemia     Paroxysmal atrial fibrillation 12/28/2016       History reviewed  No pertinent surgical history  09/26/17 1115   Note Type   Note type Eval only   Pain Assessment   Pain Assessment No/denies pain   Pain Score No Pain   Home Living   Additional Comments Pt resisdes at [facility] where he receives assistance with ADLs and reported mostly using w/c     Prior Function   Level of [place] Needs assistance with ADLs and functional mobility   Lives With Facility staff   Receives Help From Personal care attendant   ADL Assistance Needs assistance   IADLs Needs assistance   Falls in the last 6 months 0   Restrictions/Precautions   Weight Bearing Precautions Per Order No   Other Precautions Fall Risk;Bed Alarm   Cognition   Overall Cognitive Status WFL   Arousal/Participation Responsive   Orientation Level Oriented X4   Following Commands Follows one step commands with increased time or repetition   RLE Assessment   RLE Assessment X  (Pt overall strength 3+/5 in RLE assessed with bed mobility )   LLE Assessment   LLE Assessment X  (Pt overall strength 3+/5 in LLE assessed with bed mobility )   Coordination   Movements are Fluid and Coordinated 0   Sensation WFL   Bed Mobility   Rolling R 3  Moderate assistance   Additional items Assist x 1   Rolling L 3  Moderate assistance   Additional items Assist x 1   Supine to Sit 3  Moderate assistance   Additional items Assist x 1;LE management; Increased time required   Sit to Supine 3  Moderate assistance   Additional items Assist x 2; Increased time required;Verbal cues;LE management   Transfers   Sit to Stand 2  Maximal assistance  (Pt pushing posteriorly upon standing )   Additional items Assist x 2   Stand to Sit 2  Maximal assistance   Additional items Assist x 2   Additional Comments Pt initially reported using cane/RW for ambulation but then reported using kourtney lift at [facility] to go from bed to [facility]  Ambulation/Elevation   Gait pattern Not tested   Balance   Static Sitting Fair   Dynamic Sitting Fair -   Static Standing Poor   Dynamic Standing Poor   Ambulatory Zero   Endurance Deficit   Endurance Deficit Yes   Activity Tolerance   Activity Tolerance Patient limited by fatigue;Treatment limited secondary to medical complications (Comment)   Assessment   Prognosis Fair   Problem List Decreased strength;Decreased endurance;Decreased mobility; Impaired balance   Assessment Pt is an 79 y/o male who was admitted for acute on chronic CHF  Pt was told to come to ED after being at cardiologists office as he noted JVD and increased peripheral edema  Pt has a history of CKD stage 3, GERD, hypothyroidism, HLD, OA, CAD, CHF, and a-fib  PTA, pt resided in a nursing home and required assistance for ADLs and was ambulating with Ax2 and cane as per pt  During IE, pt required mod Ax1 to perform bed mobility and max Ax2 to perform transfers  Upon standing, pt was pushing posteriorly and was unable to hold himself up  Pt would benefit from inpatient physical therapy to improve strength, balance, and endurance  Recommend rehab vs  return to [address] [place] [address] upon d/c  If pt is resident at [facility] and typically uses kourtney lift, will d/c PT  If pt does not reside at [facility] part of [facility], pt will need rehab upon d/c  Goals   Patient Goals Return to [facility]     STG Expiration Date 10/06/17   Short Term Goal #1 Pt will be able to ambulate 20' with most appropriate AD and mod Ax1; pt will perform bed mobility with min A; pt will perform transfers with min A; pt will improve sitting balance to fair +   Treatment Day 0   Plan   Treatment/Interventions Functional transfer training;LE strengthening/ROM; Therapeutic exercise; Endurance training;Bed mobility;Gait training   PT Frequency 2-3x/wk   Recommendation   Recommendation Short-term skilled PT  (vs return to [place])   PT - OK to Discharge (when medically stable )   Modified Brenda Scale   Modified Brenda Scale 4   Barthel Index   Feeding 5   Bathing 0   Grooming Score 5   Dressing Score 5   Bladder Score 0   Bowels Score 0   Toilet Use Score 0   Transfers (Bed/Chair) Score 5   Mobility (Level Surface) Score 0   Stairs Score 0   Barthel Index Score 20   Samanta Cheung, SPT

## 2017-09-26 NOTE — WOUND OSTOMY CARE
Progress Note - Wound   Msgnr Hemant Glass 80 y o  male MRN: [id number]  Unit/Bed#: [facility] 709-01 Encounter: [id number]      Assessment:   Patient seen this morning for possible stage 1 pressure injury present on admission  On assessment noted intact blanching sacrum, buttocks with scabbed abrasion to R buttocks  Heels are noted pink and blanching with no issues  Primary RN at bed side during assessment, aware of findings  Patient however will benefit from prophylactic skin care and offloading orders  Plan:  Prophylactic skin care   1-Hydraguard to sacrum, buttocks and heels BID and PRN  2-Soft care cushion when out of bed  3-Moisturize skin daily with skin nourishing cream  4-Elevate heels to offload pressure  5-Turn/reposition q2h for pressure re-distribution on skin  Vitals: Blood pressure 108/59, pulse 55, temperature 98 4 °F (36 9 °C), temperature source Oral, resp  rate 18, height 6' (1 829 m), weight 111 kg (243 lb 13 3 oz), SpO2 96 %  ,Body mass index is 33 07 kg/m²  Our recommendations were placed as orders, please call wound care to ext [phone] with questions or concerns      Patty Mayes RN, BSN, [facility]

## 2017-09-26 NOTE — PLAN OF CARE
Problem: Potential for Falls  Goal: Patient will remain free of falls  INTERVENTIONS:  - Assess patient frequently for physical needs  -  Identify cognitive and physical deficits and behaviors that affect risk of falls  -  [place] fall precautions as indicated by assessment   - Educate patient/family on patient safety including physical limitations  - Instruct patient to call for assistance with activity based on assessment  - Modify environment to reduce risk of injury  - Consider OT/PT consult to assist with strengthening/mobility   Outcome: Progressing      Problem: Prexisting or High Potential for Compromised Skin Integrity  Goal: Skin integrity is maintained or improved  INTERVENTIONS:  - Identify patients at risk for skin breakdown  - Assess and monitor skin integrity  - Assess and monitor nutrition and hydration status  - Monitor labs (i e  albumin)  - Assess for incontinence   - Turn and reposition patient  - Assist with mobility/ambulation  - Relieve pressure over bony prominences  - Avoid friction and shearing  - Provide appropriate hygiene as needed including keeping skin clean and dry  - Evaluate need for skin moisturizer/barrier cream  - Collaborate with interdisciplinary team (i e  Nutrition, Rehabilitation, etc )   - Patient/family teaching   Outcome: Progressing      Problem: CARDIOVASCULAR - ADULT  Goal: Maintains optimal cardiac output and hemodynamic stability  INTERVENTIONS:  - Monitor I/O, vital signs and rhythm  - Monitor for S/S and trends of decreased cardiac output i e  bleeding, hypotension  - Administer and titrate ordered vasoactive medications to optimize hemodynamic stability  - Assess quality of pulses, skin color and temperature  - Assess for signs of decreased coronary artery perfusion - ex   Angina  - Instruct patient to report change in severity of symptoms  Outcome: Progressing    Goal: Absence of cardiac dysrhythmias or at baseline rhythm  INTERVENTIONS:  - Continuous cardiac monitoring, monitor vital signs, obtain 12 lead EKG if indicated  - Administer antiarrhythmic and heart rate control medications as ordered  - Monitor electrolytes and administer replacement therapy as ordered  Outcome: Progressing      Problem: RESPIRATORY - ADULT  Goal: Achieves optimal ventilation and oxygenation  INTERVENTIONS:  - Assess for changes in respiratory status  - Assess for changes in mentation and behavior  - Position to facilitate oxygenation and minimize respiratory effort  - Oxygen administration by appropriate delivery method based on oxygen saturation (per order) or ABGs  - Initiate smoking cessation education as indicated  - Encourage broncho-pulmonary hygiene including cough, deep breathe, Incentive Spirometry  - Assess the need for suctioning and aspirate as needed  - Assess and instruct to report SOB or any respiratory difficulty  - Respiratory Therapy support as indicated  Outcome: Progressing

## 2017-09-26 NOTE — ASSESSMENT & PLAN NOTE
· On telemetry with paced rhythm noted with heart rate 50s-60s   · Continue amiodarone, beta-blocker, aspirin  · No anticoagulation secondary to high fall risk

## 2017-09-26 NOTE — ED ATTENDING ATTESTATION
I, Kim Bertrand DO, saw and evaluated the patient  I have discussed the patient with the resident/non-physician practitioner and agree with the resident's/non-physician practitioner's findings, Plan of Care, and MDM as documented in the resident's/non-physician practitioner's note, except where noted  All available labs and Radiology studies were reviewed  At this point I agree with the current assessment done in the Emergency Department  I have conducted an independent evaluation of this patient a history and physical is as follows:      Critical Care Time  CritCare Time      80 yr old male sent to the ED by cardiology for incr in cough and sob over the past month wo improv with outpt meds  Sent for IV diuresis  No fever  Cough prod of clear phlegm only  Some edema but ? If incr since pt is poor historian  Exm; lungs: basilar crackle with 2+ YASMIN  No abd tenderness  Heart: anastasiya at 55  Ox only 99%  Pln: CHF alonzo and adm

## 2017-09-26 NOTE — ASSESSMENT & PLAN NOTE
· Patient has followed with Nephrology in the past with last follow-up progress note noted in February of 2017    His baseline document at that time was 2 1-2 4  · Patient currently below baseline  · Torsemide and metolazone on hold, currently on IV Lasix  · Monitor intake and output will discuss with nursing about trying to keep accurate I/O   · Continue to monitor with with IV diuretics, monitor for hypovolemia

## 2017-09-26 NOTE — SOCIAL WORK
CM was informed that pt was admitted from [facility]  CM spoke to Josué at [facility] who states pt is a long term resident in the skilled unit  Pt is a 15 day MA bedhold  Josué states pt is WC bound and requires a kourtney lift for transfer into the [facility] pt can return when medically stable  Pt reports plan is to return to [facility]  [facility] referral sent to [facility]

## 2017-09-26 NOTE — PLAN OF CARE
Problem: DISCHARGE PLANNING - CARE MANAGEMENT  Goal: Discharge to post-acute care or home with appropriate resources  INTERVENTIONS:  - Conduct assessment to determine patient/family and health care team treatment goals, and need for post-acute services based on payer coverage, community resources, and patient preferences, and barriers to discharge  - Address psychosocial, clinical, and financial barriers to discharge as identified in assessment in conjunction with the patient/family and health care team  - Arrange appropriate level of post-acute services according to patient's   needs and preference and payer coverage in collaboration with the physician and health care team  - Communicate with and update the patient/family, physician, and health care team regarding progress on the discharge plan  - Arrange appropriate transportation to post-acute venues  - Plan for discharge to [facility]    Outcome: Progressing

## 2017-09-26 NOTE — ED PROVIDER NOTES
History  Chief Complaint   Patient presents with    Cough     Patient sent from Dr Brian Galvez office d/t a cough for once month; pt  was told by doctor he has fluid in his lungs and has jugular vein distention; pt's doctor wants him to be started on IV diuretics    Medical Problem     This is a an 42-year-old male with a history of CAD and CHF presenting to the emergency department if he from his cardiologist's office  He was seen and evaluated today for potential pacemaker battery replacement  He was found to have some mild respiratory distress and is cardiologist also noted some JVD and increased peripheral edema  He takes Lasix at home in his cardiologist felt that he would benefit from IV Lasix  He was referred here for further evaluation  The patient himself endorses several weeks of progressively worse cough productive of clear sputum  He denies any chest pain lightheadedness or palpitations  Denies any nausea or vomiting  He denies fevers or chills  Prior to Admission Medications   Prescriptions Last Dose Informant Patient Reported? Taking? ALPRAZolam (XANAX) 0 5 mg tablet   Yes Yes   Sig: Take 0 5 mg by mouth daily at bedtime  Dextromethorphan-Guaifenesin (ROBITUSSIN DM PO)   Yes Yes   Sig: Take 10 mL by mouth every 4 (four) hours as needed  Melatonin 10 MG CAPS   Yes Yes   Sig: Take 10 mg by mouth daily at bedtime  Multiple Vitamins-Minerals (THERA-M PO)   Yes Yes   Sig: Take 1 tablet by mouth daily  acetaminophen (TYLENOL) 325 mg tablet   Yes Yes   Sig: Take 325 mg by mouth every 4 (four) hours as needed for mild pain  albuterol (2 5 mg/3 mL) 0 083 % nebulizer solution   Yes Yes   Sig: Take 2 5 mg by nebulization every 4 (four) hours as needed for wheezing   allopurinol (ZYLOPRIM) 100 mg tablet   Yes Yes   Sig: Take 100 mg by mouth daily     aluminum-magnesium hydroxide-simethicone (MYLANTA) 200-200-20 mg/5 mL suspension   Yes Yes   Sig: Take 15 mL by mouth daily as needed for indigestion or heartburn  amiodarone 200 mg tablet   Yes Yes   Sig: Take 200 mg by mouth 2 (two) times a day  aspirin 81 MG tablet   Yes Yes   Sig: Take 81 mg by mouth daily  atorvastatin (LIPITOR) 40 mg tablet   Yes Yes   Sig: Take 40 mg by mouth daily  carvedilol (COREG) 3 125 mg tablet   Yes Yes   Sig: Take 3 125 mg by mouth 2 (two) times a day with meals  furosemide (LASIX) 40 mg tablet   No Yes   Sig: Take 2 tablets by mouth daily for 30 days 60 mg in am, 40 mg in pm   furosemide (LASIX) 80 mg tablet   Yes Yes   Sig: Take 80 mg by mouth daily   levothyroxine 50 mcg tablet   Yes Yes   Sig: Take 50 mcg by mouth daily  metolazone (ZAROXOLYN) 2 5 mg tablet   Yes Yes   Sig: Take 2 5 mg by mouth 2 (two) times a day   nitroglycerin (NITROSTAT) 0 4 mg SL tablet   Yes Yes   Sig: Place 0 4 mg under the tongue every 5 (five) minutes as needed for chest pain  omeprazole (PriLOSEC) 20 mg delayed release capsule   Yes Yes   Sig: Take 20 mg by mouth daily  potassium chloride 20 MEQ TBCR   No Yes   Sig: Take 2 tablets by mouth daily for 30 days   ramipril (ALTACE) 5 mg capsule   No Yes   Sig: Take 1 capsule by mouth daily for 30 days   ranolazine (RANEXA) 500 mg 12 hr tablet   Yes Yes   Sig: Take 500 mg by mouth 2 (two) times a day  Facility-Administered Medications: None       Past Medical History:   Diagnosis Date    Acquired hypothyroidism 12/28/2016    Anxiety     Atrial fibrillation     Cardiac disease     CHF (congestive heart failure)     Disease of thyroid gland     Gout     Hyperlipidemia     Paroxysmal atrial fibrillation 12/28/2016       History reviewed  No pertinent surgical history  History reviewed  No pertinent family history  I have reviewed and agree with the history as documented      Social History   Substance Use Topics    Smoking status: Never Smoker    Smokeless tobacco: Never Used    Alcohol use No        Review of Systems   Constitutional: Negative for appetite change, chills, fatigue and fever  HENT: Negative for sneezing and sore throat  Eyes: Negative for visual disturbance  Respiratory: Positive for cough and shortness of breath  Negative for choking, chest tightness and wheezing  Cardiovascular: Negative for chest pain and palpitations  Gastrointestinal: Negative for abdominal pain, constipation, diarrhea, nausea and vomiting  Genitourinary: Negative for difficulty urinating and dysuria  Neurological: Negative for dizziness, weakness, light-headedness, numbness and headaches  All other systems reviewed and are negative  Physical Exam  ED Triage Vitals [09/25/17 1727]   Temperature Pulse Respirations Blood Pressure SpO2   98 5 °F (36 9 °C) 55 20 152/72 99 %      Temp Source Heart Rate Source Patient Position - Orthostatic VS BP Location FiO2 (%)   Tympanic Monitor Sitting Right arm --      Pain Score       No Pain           Physical Exam   Constitutional: He is oriented to person, place, and time  He appears well-developed and well-nourished  No distress  HENT:   Head: Normocephalic and atraumatic  Mouth/Throat: Oropharynx is clear and moist    Eyes: EOM are normal  Pupils are equal, round, and reactive to light  Neck: No JVD present  No tracheal deviation present  Cardiovascular: Normal rate, regular rhythm, normal heart sounds and intact distal pulses  Exam reveals no gallop and no friction rub  No murmur heard  2+ peripheral edema bilaterally  Pulmonary/Chest: Effort normal  No respiratory distress  He has wheezes  He has rales  Abdominal: Soft  Bowel sounds are normal  He exhibits no distension  There is no tenderness  There is no rebound and no guarding  Neurological: He is alert and oriented to person, place, and time  No cranial nerve deficit  He exhibits normal muscle tone  Skin: Skin is warm and dry  He is not diaphoretic  No pallor  Psychiatric: He has a normal mood and affect   His behavior is normal    Nursing note and vitals reviewed  ED Medications  Medications   acetaminophen (TYLENOL) tablet 325 mg (not administered)   albuterol inhalation solution 2 5 mg (not administered)   allopurinol (ZYLOPRIM) tablet 100 mg (not administered)   ALPRAZolam (XANAX) tablet 0 5 mg (not administered)   amiodarone tablet 200 mg (not administered)   aspirin chewable tablet 81 mg (not administered)   atorvastatin (LIPITOR) tablet 40 mg (not administered)   carvedilol (COREG) tablet 3 125 mg (not administered)   levothyroxine tablet 50 mcg (not administered)   melatonin tablet 12 mg (not administered)   pantoprazole (PROTONIX) EC tablet 20 mg (not administered)   potassium chloride (K-DUR,KLOR-CON) CR tablet 40 mEq (not administered)   ramipril (ALTACE) capsule 5 mg (not administered)   ranolazine (RANEXA) 12 hr tablet 500 mg (not administered)   enoxaparin (LOVENOX) subcutaneous injection 40 mg (not administered)   furosemide (LASIX) injection 40 mg (not administered)   furosemide (LASIX) injection 20 mg (20 mg Intravenous Given 9/25/17 2156)   furosemide (LASIX) injection 20 mg (20 mg Intravenous Given 9/25/17 2156)       Diagnostic Studies  Labs Reviewed   COMPREHENSIVE METABOLIC PANEL - Abnormal        Result Value Ref Range Status    BUN 30 (*) 5 - 25 mg/dL Final    Creatinine 1 51 (*) 0 60 - 1 30 mg/dL Final    Comment: Standardized to IDMS reference method    AST 79 (*) 5 - 45 U/L Final    Comment:   Specimen collection should occur prior to Sulfasalazine administration due to the potential for falsely depressed results       Alkaline Phosphatase 126 (*) 46 - 116 U/L Final    Albumin 2 9 (*) 3 5 - 5 0 g/dL Final    Sodium 139  136 - 145 mmol/L Final    Potassium 4 6  3 5 - 5 3 mmol/L Final    Chloride 108  100 - 108 mmol/L Final    CO2 22  21 - 32 mmol/L Final    Anion Gap 9  4 - 13 mmol/L Final    Glucose 94  65 - 140 mg/dL Final    Comment:   If the patient is fasting, the ADA then defines impaired fasting glucose as > 100 mg/dL and diabetes as > or equal to 123 mg/dL  Specimen collection should occur prior to Sulfasalazine administration due to the potential for falsely depressed results  Specimen collection should occur prior to Sulfapyridine administration due to the potential for falsely elevated results  Calcium 9 4  8 3 - 10 1 mg/dL Final    ALT 73  12 - 78 U/L Final    Comment:   Specimen collection should occur prior to Sulfasalazine and/or Sulfapyridine administration due to the potential for falsely depressed results  Total Protein 7 4  6 4 - 8 2 g/dL Final    Total Bilirubin 0 64  0 20 - 1 00 mg/dL Final    eGFR 41  ml/min/1 73sq m Final    Narrative:     National Kidney Disease Education Program recommendations are as follows:  GFR calculation is accurate only with a steady state creatinine  Chronic Kidney disease less than 60 ml/min/1 73 sq  meters  Kidney failure less than 15 ml/min/1 73 sq  meters     CBC AND DIFFERENTIAL - Abnormal     RBC 3 75 (*) 3 88 - 5 62 Million/uL Final    MCV 99 (*) 82 - 98 fL Final    WBC 5 45  4 31 - 10 16 Thousand/uL Final    Hemoglobin 12 2  12 0 - 17 0 g/dL Final    Hematocrit 37 2  36 5 - 49 3 % Final    MCH 32 5  26 8 - 34 3 pg Final    MCHC 32 8  31 4 - 37 4 g/dL Final    RDW 14 5  11 6 - 15 1 % Final    MPV 10 7  8 9 - 12 7 fL Final    Platelets 829  614 - 390 Thousands/uL Final    nRBC 0  /100 WBCs Final    Neutrophils Relative 54  43 - 75 % Final    Lymphocytes Relative 31  14 - 44 % Final    Monocytes Relative 11  4 - 12 % Final    Eosinophils Relative 3  0 - 6 % Final    Basophils Relative 1  0 - 1 % Final    Neutrophils Absolute 2 99  1 85 - 7 62 Thousands/µL Final    Lymphocytes Absolute 1 68  0 60 - 4 47 Thousands/µL Final    Monocytes Absolute 0 58  0 17 - 1 22 Thousand/µL Final    Eosinophils Absolute 0 16  0 00 - 0 61 Thousand/µL Final    Basophils Absolute 0 03  0 00 - 0 10 Thousands/µL Final   NT-BNP PRO (BRAIN NATRIURETIC PEPTIDE) - Abnormal     NT-proBNP 1,661 (*) <450 pg/mL Final   TROPONIN I - Normal    Troponin I 0 02  <=0 04 ng/mL Final    Narrative:     [employer] Chemistry analyzer 99% cutoff is > 0 04 ng/mL in network labs    o cTnI 99% cutoff is useful only when applied to patients in the clinical setting of myocardial ischemia  o cTnI 99% cutoff should be interpreted in the context of clinical history, ECG findings and possibly cardiac imaging to establish correct diagnosis  o cTnI 99% cutoff may be suggestive but clearly not indicative of a coronary event without the clinical setting of myocardial ischemia  LIGHT BLUE TOP   GOLD TOP ON HOLD       X-ray chest 2 views   ED Interpretation   Mild pulmonary vascular congestion          Procedures  Procedures      Phone Consults  ED Phone Contact    ED Course  ED Course            Identification of Seniors at [facility] Most Recent Value   (ISAR) Identification of Seniors at Risk   Before the illness or injury that brought you to the Emergency, did you need someone to help you on a regular basis? 1 Filed at: 09/25/2017 1728   In the last 24 hours, have you needed more help than usual?  1 Filed at: 09/25/2017 1728   Have you been hospitalized for one or more nights during the past 6 months? 1 Filed at: 09/25/2017 1728   In general, do you see well?  0 Filed at: 09/25/2017 1728   In general, do you have serious problems with your memory? 0 Filed at: 09/25/2017 1728   Do you take more than three different medications every day? 1 Filed at: 09/25/2017 1728   ISAR Score  4 Filed at: 09/25/2017 1728                        MDM  Number of Diagnoses or Management Options  Acute exacerbation of CHF (congestive heart failure):   Diagnosis management comments: 70-year-old male with likely exacerbation of CHF  Patient does have CKD however review of laboratory work today is not suggestive of EDEN    Will check chest x-ray, cardiac workup, likely give IV Lasix and consult cardiology for cardiology versus medical admission  CritCare Time    Disposition  Final diagnoses:   Acute exacerbation of CHF (congestive heart failure)     ED Disposition     ED Disposition Condition Comment    Admit  Case was discussed with SHIRLENE and the patient's admission status was agreed to be Admission Status: observation status to the service of Dr Stalin Bell   Follow-up Information    None       Current Discharge Medication List      CONTINUE these medications which have NOT CHANGED    Details   acetaminophen (TYLENOL) 325 mg tablet Take 325 mg by mouth every 4 (four) hours as needed for mild pain  albuterol (2 5 mg/3 mL) 0 083 % nebulizer solution Take 2 5 mg by nebulization every 4 (four) hours as needed for wheezing      allopurinol (ZYLOPRIM) 100 mg tablet Take 100 mg by mouth daily  ALPRAZolam (XANAX) 0 5 mg tablet Take 0 5 mg by mouth daily at bedtime  aluminum-magnesium hydroxide-simethicone (MYLANTA) 200-200-20 mg/5 mL suspension Take 15 mL by mouth daily as needed for indigestion or heartburn  amiodarone 200 mg tablet Take 200 mg by mouth 2 (two) times a day  aspirin 81 MG tablet Take 81 mg by mouth daily  atorvastatin (LIPITOR) 40 mg tablet Take 40 mg by mouth daily  carvedilol (COREG) 3 125 mg tablet Take 3 125 mg by mouth 2 (two) times a day with meals  Dextromethorphan-Guaifenesin (ROBITUSSIN DM PO) Take 10 mL by mouth every 4 (four) hours as needed  furosemide (LASIX) 80 mg tablet Take 80 mg by mouth daily      levothyroxine 50 mcg tablet Take 50 mcg by mouth daily  Melatonin 10 MG CAPS Take 10 mg by mouth daily at bedtime  metolazone (ZAROXOLYN) 2 5 mg tablet Take 2 5 mg by mouth 2 (two) times a day      Multiple Vitamins-Minerals (THERA-M PO) Take 1 tablet by mouth daily  nitroglycerin (NITROSTAT) 0 4 mg SL tablet Place 0 4 mg under the tongue every 5 (five) minutes as needed for chest pain        omeprazole (PriLOSEC) 20 mg delayed release capsule Take 20 mg by mouth daily  potassium chloride 20 MEQ TBCR Take 2 tablets by mouth daily for 30 days  Qty: 60 tablet, Refills: 0      ramipril (ALTACE) 5 mg capsule Take 1 capsule by mouth daily for 30 days  Qty: 30 capsule, Refills: 0      ranolazine (RANEXA) 500 mg 12 hr tablet Take 500 mg by mouth 2 (two) times a day  No discharge procedures on file  ED Provider  Attending physically available and evaluated [facility] Madelyn Pickering I managed the patient along with the ED Attending      Electronically Signed by       Arpan Hyman  Resident  09/26/17 7609

## 2017-09-26 NOTE — PROGRESS NOTES
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   Assessment & Plan    · Present on admission as evidenced by edema, persisting cough, JVD noted by cardiologist during an outpatient visit to have ICD battery updated  · Chest x-ray with mild pulmonary vascular congestion, BNP 1661  · Cardiology following  · Continue beta-blocker  · Plan to continue IV diuresis  · Continue daily weights (per documentation 7 lb weight loss noted unclear if this is accurate will continue to monitor daily)  · Continue I & O        Hyperlipidemia   Assessment & Plan    · Continue statin        Paroxysmal atrial fibrillation   Assessment & Plan    · On telemetry with paced rhythm noted with heart rate 50s-60s   · Continue amiodarone, beta-blocker, aspirin  · No anticoagulation secondary to high fall risk        CAD (coronary artery disease)    Assessment & Plan    · Continue statin, beta-blocker, aspirin        Acute kidney injury on Chronic kidney disease (CKD), stage III (moderate)   Assessment & Plan    · Patient has followed with Nephrology in the past with last follow-up progress note noted in February of 2017    His baseline document at that time was 2 1-2 4  · Patient currently below baseline  · Torsemide and metolazone on hold, currently on IV Lasix  · Monitor intake and output will discuss with nursing about trying to keep accurate I/O   · Continue to monitor with with IV diuretics, monitor for hypovolemia        Decubitus ulcer of sacral region, stage 1   Assessment & Plan    · Present on admission  · Wound Care following, recommendations noted  · Will utilize Prevalon boots to BLE        Hypothyroidism   Assessment & Plan    · Check TSH  · Continue levothyroxine VTE Pharmacologic Prophylaxis:   Pharmacologic: Enoxaparin (Lovenox)  Mechanical VTE Prophylaxis in Place: No    Patient Centered Rounds: I have performed bedside rounds with nursing staff today  Discussions with Specialists or Other Care Team Provider:  Cardiology    Education and Discussions with Family / Patient:  Patient    Time Spent for Care: 30 minutes  More than 50% of total time spent on counseling and coordination of care as described above  Current Length of Stay: 0 day(s)    Current Patient Status: Inpatient   Certification Statement: The patient, admitted on an observation basis, will now require > 2 midnight hospital stay due to Will require inpatient status secondary to IV diuretics for management of acute on chronic combo congestive heart failure    Discharge Plan:  Return to [facility] when medically stable and cleared from a cardiac standpoint    Code Status: Level 1 - Full Code      Subjective:   Patient denies shortness of breath although he appears short of breath with conversation and continues with cough throughout the conversation  Cough is nonproductive  States his cough is secondary to a month long cold  He denies orthopnea  He does not report any headache, dizziness  No nausea or vomiting  Tolerating diet  Objective:     Vitals:   Temp (24hrs), Av 1 °F (36 7 °C), Min:97 8 °F (36 6 °C), Max:98 5 °F (36 9 °C)    HR:  [52-62] 55  Resp:  [18-20] 18  BP: (104-183)/(53-96) 104/53  SpO2:  [90 %-100 %] 96 %  Body mass index is 33 07 kg/m²  Input and Output Summary (last 24 hours): Intake/Output Summary (Last 24 hours) at 17 1621  Last data filed at 17 1414   Gross per 24 hour   Intake              540 ml   Output             2336 ml   Net            -1796 ml       Physical Exam:     Physical Exam   Constitutional: He is oriented to person, place, and time  He appears well-developed and well-nourished     Mild shortness of breath with conversation and a faint expiratory wheeze noted   HENT:   Head: Normocephalic  Neck: Neck supple  Cardiovascular: Regular rhythm  Bradycardia present  Murmur heard  Pulmonary/Chest: Effort normal  No respiratory distress  He has decreased breath sounds  Exertional wheezes noted anterior chest   Abdominal: Soft  Bowel sounds are normal  He exhibits no distension  There is no tenderness  Obese   Musculoskeletal: Normal range of motion  He exhibits edema  +1 pitting pedal ankle edema right greater than left   Neurological: He is alert and oriented to person, place, and time  Skin: Skin is warm and dry  Psychiatric: He has a normal mood and affect  His behavior is normal    Vitals reviewed  Additional Data:     Labs:      Results from last 7 days  Lab Units 09/26/17  0618 09/25/17  1735   WBC Thousand/uL 5 71 5 45   HEMOGLOBIN g/dL 11 7* 12 2   HEMATOCRIT % 36 2* 37 2   PLATELETS Thousands/uL 238 253   NEUTROS PCT %  --  54   LYMPHS PCT %  --  31   MONOS PCT %  --  11   EOS PCT %  --  3       Results from last 7 days  Lab Units 09/26/17  0618 09/25/17  1735   SODIUM mmol/L 139 139   POTASSIUM mmol/L 4 1 4 6   CHLORIDE mmol/L 105 108   CO2 mmol/L 26 22   BUN mg/dL 28* 30*   CREATININE mg/dL 1 44* 1 51*   CALCIUM mg/dL 9 2 9 4   TOTAL PROTEIN g/dL  --  7 4   BILIRUBIN TOTAL mg/dL  --  0 64   ALK PHOS U/L  --  126*   ALT U/L  --  73   AST U/L  --  79*   GLUCOSE RANDOM mg/dL 91 94           * I Have Reviewed All Lab Data Listed Above  * Additional Pertinent Lab Tests Reviewed:  Li 66 Admission Reviewed    Imaging:    Imaging Reports Reviewed Today Include: CXR  Imaging Personally Reviewed by Myself Includes:  CXR    Recent Cultures (last 7 days):           Last 24 Hours Medication List:     allopurinol 100 mg Oral Daily   ALPRAZolam 0 5 mg Oral HS   amiodarone 200 mg Oral BID   aspirin 81 mg Oral Daily   atorvastatin 40 mg Oral Daily   carvedilol 3 125 mg Oral BID With Meals enoxaparin 40 mg Subcutaneous Daily   furosemide 40 mg Intravenous BID (diuretic)   levothyroxine 50 mcg Oral Early Morning   melatonin 12 mg Oral HS   pantoprazole 20 mg Oral Early Morning   potassium chloride 40 mEq Oral Daily   ramipril 5 mg Oral Daily   ranolazine 500 mg Oral BID        Today, Patient Was Seen By: PATTIE Mcdonald

## 2017-09-26 NOTE — ASSESSMENT & PLAN NOTE
· Present on admission  · Wound Care following, recommendations noted  · Will utilize Prevalon boots to BLE

## 2017-09-27 LAB
ANION GAP SERPL CALCULATED.3IONS-SCNC: 9 MMOL/L (ref 4–13)
BUN SERPL-MCNC: 31 MG/DL (ref 5–25)
CALCIUM SERPL-MCNC: 9.2 MG/DL (ref 8.3–10.1)
CHLORIDE SERPL-SCNC: 105 MMOL/L (ref 100–108)
CO2 SERPL-SCNC: 23 MMOL/L (ref 21–32)
CREAT SERPL-MCNC: 1.58 MG/DL (ref 0.6–1.3)
GFR SERPL CREATININE-BSD FRML MDRD: 39 ML/MIN/1.73SQ M
GLUCOSE SERPL-MCNC: 90 MG/DL (ref 65–140)
POTASSIUM SERPL-SCNC: 3.8 MMOL/L (ref 3.5–5.3)
SODIUM SERPL-SCNC: 137 MMOL/L (ref 136–145)

## 2017-09-27 PROCEDURE — 80048 BASIC METABOLIC PNL TOTAL CA: CPT | Performed by: NURSE PRACTITIONER

## 2017-09-27 RX ORDER — SENNOSIDES 8.6 MG
1 TABLET ORAL
Status: DISCONTINUED | OUTPATIENT
Start: 2017-09-27 | End: 2017-09-30 | Stop reason: HOSPADM

## 2017-09-27 RX ORDER — DOCUSATE SODIUM 100 MG/1
100 CAPSULE, LIQUID FILLED ORAL 2 TIMES DAILY
Status: DISCONTINUED | OUTPATIENT
Start: 2017-09-27 | End: 2017-09-30 | Stop reason: HOSPADM

## 2017-09-27 RX ADMIN — CARVEDILOL 3.12 MG: 3.12 TABLET, FILM COATED ORAL at 08:05

## 2017-09-27 RX ADMIN — ENOXAPARIN SODIUM 40 MG: 40 INJECTION SUBCUTANEOUS at 08:05

## 2017-09-27 RX ADMIN — LEVOTHYROXINE SODIUM 50 MCG: 88 TABLET ORAL at 06:29

## 2017-09-27 RX ADMIN — FUROSEMIDE 40 MG: 10 INJECTION, SOLUTION INTRAMUSCULAR; INTRAVENOUS at 08:06

## 2017-09-27 RX ADMIN — MELATONIN TAB 3 MG 12 MG: 3 TAB at 22:05

## 2017-09-27 RX ADMIN — DOCUSATE SODIUM 100 MG: 100 CAPSULE, LIQUID FILLED ORAL at 17:13

## 2017-09-27 RX ADMIN — RAMIPRIL 5 MG: 5 CAPSULE ORAL at 09:53

## 2017-09-27 RX ADMIN — ATORVASTATIN CALCIUM 40 MG: 40 TABLET, FILM COATED ORAL at 08:05

## 2017-09-27 RX ADMIN — ASPIRIN 81 MG 81 MG: 81 TABLET ORAL at 08:06

## 2017-09-27 RX ADMIN — AMIODARONE HYDROCHLORIDE 200 MG: 200 TABLET ORAL at 17:13

## 2017-09-27 RX ADMIN — ALPRAZOLAM 0.5 MG: 0.5 TABLET ORAL at 22:05

## 2017-09-27 RX ADMIN — POTASSIUM CHLORIDE 40 MEQ: 750 TABLET, EXTENDED RELEASE ORAL at 08:05

## 2017-09-27 RX ADMIN — ALLOPURINOL 100 MG: 100 TABLET ORAL at 08:05

## 2017-09-27 RX ADMIN — RANOLAZINE 500 MG: 500 TABLET, FILM COATED, EXTENDED RELEASE ORAL at 17:13

## 2017-09-27 RX ADMIN — PANTOPRAZOLE SODIUM 20 MG: 20 TABLET, DELAYED RELEASE ORAL at 06:29

## 2017-09-27 RX ADMIN — RANOLAZINE 500 MG: 500 TABLET, FILM COATED, EXTENDED RELEASE ORAL at 09:53

## 2017-09-27 RX ADMIN — CARVEDILOL 3.12 MG: 3.12 TABLET, FILM COATED ORAL at 17:13

## 2017-09-27 RX ADMIN — AMIODARONE HYDROCHLORIDE 200 MG: 200 TABLET ORAL at 08:05

## 2017-09-27 RX ADMIN — FUROSEMIDE 40 MG: 10 INJECTION, SOLUTION INTRAMUSCULAR; INTRAVENOUS at 17:13

## 2017-09-27 RX ADMIN — SENNOSIDES 8.6 MG: 8.6 TABLET, FILM COATED ORAL at 22:05

## 2017-09-27 NOTE — PLAN OF CARE
CARDIOVASCULAR - ADULT     Maintains optimal cardiac output and hemodynamic stability Progressing     Absence of cardiac dysrhythmias or at baseline rhythm Progressing        DISCHARGE PLANNING - CARE MANAGEMENT     Discharge to post-acute care or home with appropriate resources Progressing        Potential for Falls     Patient will remain free of falls Progressing        Prexisting or High Potential for Compromised Skin Integrity     Skin integrity is maintained or improved Progressing        RESPIRATORY - ADULT     Achieves optimal ventilation and oxygenation Progressing

## 2017-09-27 NOTE — PROGRESS NOTES
[facility] pt is a bed hold at [facility], and at baseline is kourtney lift for OOB  Will sign off  May return to [facility] when stable    Jose Guadarrama PT, DPT CSRS

## 2017-09-27 NOTE — ASSESSMENT & PLAN NOTE
· Paced rhythm heart rates 50-60s  · Continue amiodarone, beta-blocker, aspirin  · No anticoagulation secondary to high fall risk

## 2017-09-27 NOTE — SUBJECTIVE & OBJECTIVE
VTE Pharmacologic Prophylaxis:   Pharmacologic: Enoxaparin (Lovenox)  Mechanical VTE Prophylaxis in Place: No    Patient Centered Rounds: I have performed bedside rounds with nursing staff today  Discussions with Specialists or Other Care Team Provider:  Cardiology    Education and Discussions with Family / Patient:  Patient and friends at bedside    Time Spent for Care: 30 minutes  More than 50% of total time spent on counseling and coordination of care as described above  Current Length of Stay: 1 day(s)    Current Patient Status: Inpatient   Certification Statement: The patient will continue to require additional inpatient hospital stay due to Continue IV diuresis for acute congestive heart failure and need for ICD generator change on Friday    Discharge Plan:  Return to [facility] possibly Saturday if clear by Cardiology and remained medically stable    Code Status: Level 1 - Full Code      Subjective:   Patient without complaints of pain  He denies shortness of breath although when exerting himself he appears short of breath  Occasional audible wheeze  No chest pain reported  States no bowel movement for 3 days but denies a constipated feeling  Appetite is fair  No nausea or vomiting  Objective:     Vitals:   Temp (24hrs), Av °F (36 7 °C), Min:97 8 °F (36 6 °C), Max:98 2 °F (36 8 °C)    HR:  [55-65] 55  Resp:  [18] 18  BP: ()/(53-67) 91/53  SpO2:  [96 %-98 %] 97 %  Body mass index is 33 61 kg/m²  Input and Output Summary (last 24 hours): Intake/Output Summary (Last 24 hours) at 17 1443  Last data filed at 17 5515   Gross per 24 hour   Intake              120 ml   Output             1248 ml   Net            -1128 ml       Physical Exam:     Physical Exam   Constitutional: He is oriented to person, place, and time  He appears well-developed and well-nourished  No distress  HENT:   Head: Normocephalic  Neck: Neck supple  JVD present     Cardiovascular: Bradycardia present  Murmur heard  Pulmonary/Chest: Effort normal  No respiratory distress  He has decreased breath sounds  He has wheezes  Faint wheezes   Abdominal: Soft  Bowel sounds are normal  He exhibits no distension  There is no tenderness  Obese   Musculoskeletal: Normal range of motion  He exhibits edema  Trace edema right greater than left   Neurological: He is alert and oriented to person, place, and time  No cranial nerve deficit  Skin: Skin is warm and dry  prevelon boots   Psychiatric: He has a normal mood and affect  His behavior is normal    Vitals reviewed  Additional Data:     Labs:      Results from last 7 days  Lab Units 09/26/17  0618 09/25/17  1735   WBC Thousand/uL 5 71 5 45   HEMOGLOBIN g/dL 11 7* 12 2   HEMATOCRIT % 36 2* 37 2   PLATELETS Thousands/uL 238 253   NEUTROS PCT %  --  54   LYMPHS PCT %  --  31   MONOS PCT %  --  11   EOS PCT %  --  3       Results from last 7 days  Lab Units 09/27/17  0506  09/25/17  1735   SODIUM mmol/L 137  < > 139   POTASSIUM mmol/L 3 8  < > 4 6   CHLORIDE mmol/L 105  < > 108   CO2 mmol/L 23  < > 22   BUN mg/dL 31*  < > 30*   CREATININE mg/dL 1 58*  < > 1 51*   CALCIUM mg/dL 9 2  < > 9 4   TOTAL PROTEIN g/dL  --   --  7 4   BILIRUBIN TOTAL mg/dL  --   --  0 64   ALK PHOS U/L  --   --  126*   ALT U/L  --   --  73   AST U/L  --   --  79*   GLUCOSE RANDOM mg/dL 90  < > 94   < > = values in this interval not displayed  * I Have Reviewed All Lab Data Listed Above  * Additional Pertinent Lab Tests Reviewed:  All Labs Within Last 24 Hours Reviewed    Imaging:    Imaging Reports Reviewed Today Include:  None  Imaging Personally Reviewed by Myself Includes:  None    Recent Cultures (last 7 days):           Last 24 Hours Medication List:     allopurinol 100 mg Oral Daily   ALPRAZolam 0 5 mg Oral HS   amiodarone 200 mg Oral BID   aspirin 81 mg Oral Daily   atorvastatin 40 mg Oral Daily   carvedilol 3 125 mg Oral BID With Meals   docusate sodium 100 mg Oral BID   enoxaparin 40 mg Subcutaneous Daily   furosemide 40 mg Intravenous BID (diuretic)   levothyroxine 50 mcg Oral Early Morning   melatonin 12 mg Oral HS   pantoprazole 20 mg Oral Early Morning   potassium chloride 40 mEq Oral Daily   ramipril 5 mg Oral Daily   ranolazine 500 mg Oral BID   senna 1 tablet Oral HS        Today, Patient Was Seen By: PATTIE Odom    ** Please Note: Dragon 360 Dictation voice to text software may have been used in the creation of this document   **

## 2017-09-27 NOTE — PROGRESS NOTES
Progress Note - Cardiology   Msgnr Cyndy Colin 80 y o  male MRN: [id number]  [facility] Encounter: [id number]    Assessment:  -acute on chronic CHF with combined systolic and diastolic dysfunction, not decompensated  -Coronary artery disease status post MI  Ischemic cardiomyopathy with biventricular pacer/ICD  -Paroxysmal AFib not on anticoagulation    Plan:  Continue 40 mg Lasix IV b i d  possibly for 1 more day of IV diuresis   monitor serum potassium and replace as needed  Daily weights, continue to monitor intake and urine output  Salt restricted diet less than 1500 mg  Fluid restriction less than 1800 mL  Plan discussed with the EPS regarding patient's  Pacer generator      Subjective/Objective     Subjective:  Patient well-appearing, good urine output  Denies any shortness of breath but still has cough  No chest pain, no fever  Objective:  General:  Elderly male no acute distress  Eyes normal conjunctiva  Neck supple, no JVD apparent  Cardiovascular irregularly irregular rhythm without murmur, mild bilateral lower extremity edema  Respiratory:   For lungs have bilateral crackles and wheeze although no apparent respiratory distress or increased work of breathing  Abdomen is nondistended  Skin is warm and dry not diaphoretic    Vitals: /54   Pulse 65   Temp 98 2 °F (36 8 °C) (Oral)   Resp 18   Ht 6' (1 829 m)   Wt 112 kg (247 lb 12 8 oz)   SpO2 98%   BMI 33 61 kg/m²   Vitals:    09/26/17 0037 09/27/17 0600   Weight: 111 kg (243 lb 13 3 oz) 112 kg (247 lb 12 8 oz)     Orthostatic Blood Pressures    Flowsheet Row Most Recent Value   Blood Pressure  115/54 filed at 09/27/2017 0831   Patient Position - Orthostatic VS  Lying filed at 09/27/2017 0831            Intake/Output Summary (Last 24 hours) at 09/27/17 1206  Last data filed at 09/27/17 0928   Gross per 24 hour   Intake              120 ml   Output             1573 ml   Net            -1453 ml       Invasive Devices Peripheral Intravenous Line            Peripheral IV 09/27/17 Left Antecubital less than 1 day                  Lab Results: I have personally reviewed pertinent lab results  Imaging: I have personally reviewed pertinent reports        VTE Pharmacologic Prophylaxis: Enoxaparin (Lovenox)  VTE Mechanical Prophylaxis: sequential compression device

## 2017-09-27 NOTE — ASSESSMENT & PLAN NOTE
· Present on admission as evidenced by edema, persisting cough, JVD noted by cardiologist during an outpatient visit to have ICD battery updated  · Chest x-ray with mild pulmonary vascular congestion, BNP 1661  · Cardiology following, recommend to continue IV diuresis for at least 1 more day  · Continue beta-blocker  · Weight stable ~3 lb decrease since admission   · Continue I & O and daily weight

## 2017-09-27 NOTE — ASSESSMENT & PLAN NOTE
· Patient has followed with Nephrology in the past with last follow-up progress note noted in February of 2017    His baseline document at that time was 2 1-2 4  · Patient remains below baseline  · Torsemide and metolazone on hold, currently on IV Lasix  · Monitor intake and output  (net -1000)  · Continue to monitor with with IV diuretics, monitor for hypovolemia

## 2017-09-27 NOTE — PROGRESS NOTES
Progress Note - Msgnr Tyrell Ortega 80 y o  male MRN: [id number]    [facility]: [id number]        Ischemic cardiomyopathy status post ICD   Assessment & Plan    · EP plan for generator change on Friday        * Acute on chronic combined systolic and diastolic (congestive) heart failure   Assessment & Plan    · Present on admission as evidenced by edema, persisting cough, JVD noted by cardiologist during an outpatient visit to have ICD battery updated  · Chest x-ray with mild pulmonary vascular congestion, BNP 1661  · Cardiology following, recommend to continue IV diuresis for at least 1 more day  · Continue beta-blocker  · Weight stable ~3 lb decrease since admission   · Continue I & O and daily weight        Hyperlipidemia   Assessment & Plan    · Continue statin        Paroxysmal atrial fibrillation   Assessment & Plan    · Paced rhythm heart rates 50-60s  · Continue amiodarone, beta-blocker, aspirin  · No anticoagulation secondary to high fall risk        CAD (coronary artery disease)    Assessment & Plan    · Continue statin, beta-blocker, aspirin        Acute kidney injury on Chronic kidney disease (CKD), stage III (moderate)   Assessment & Plan    · Patient has followed with Nephrology in the past with last follow-up progress note noted in February of 2017    His baseline document at that time was 2 1-2 4  · Patient remains below baseline  · Torsemide and metolazone on hold, currently on IV Lasix  · Monitor intake and output  (net -1000)  · Continue to monitor with with IV diuretics, monitor for hypovolemia        Decubitus ulcer of sacral region, stage 1   Assessment & Plan    · Present on admission  · Follow up wound care recommendations        Hypothyroidism   Assessment & Plan    · TSH 2 340  · Continue levothyroxine              VTE Pharmacologic Prophylaxis:   Pharmacologic: Enoxaparin (Lovenox)  Mechanical VTE Prophylaxis in Place: No    Patient Centered Rounds: I have performed bedside rounds with nursing staff today  Discussions with Specialists or Other Care Team Provider:  Cardiology    Education and Discussions with Family / Patient:  Patient and friends at bedside    Time Spent for Care: 30 minutes  More than 50% of total time spent on counseling and coordination of care as described above  Current Length of Stay: 1 day(s)    Current Patient Status: Inpatient   Certification Statement: The patient will continue to require additional inpatient hospital stay due to Continue IV diuresis for acute congestive heart failure and need for ICD generator change on Friday    Discharge Plan:  Return to [facility] possibly Saturday if clear by Cardiology and remained medically stable    Code Status: Level 1 - Full Code      Subjective:   Patient without complaints of pain  He denies shortness of breath although when exerting himself he appears short of breath  Occasional audible wheeze  No chest pain reported  States no bowel movement for 3 days but denies a constipated feeling  Appetite is fair  No nausea or vomiting  Objective:     Vitals:   Temp (24hrs), Av °F (36 7 °C), Min:97 8 °F (36 6 °C), Max:98 2 °F (36 8 °C)    HR:  [55-65] 55  Resp:  [18] 18  BP: ()/(53-67) 91/53  SpO2:  [96 %-98 %] 97 %  Body mass index is 33 61 kg/m²  Input and Output Summary (last 24 hours): Intake/Output Summary (Last 24 hours) at 17 1443  Last data filed at 17 2791   Gross per 24 hour   Intake              120 ml   Output             1248 ml   Net            -1128 ml       Physical Exam:     Physical Exam   Constitutional: He is oriented to person, place, and time  He appears well-developed and well-nourished  No distress  HENT:   Head: Normocephalic  Neck: Neck supple  JVD present  Cardiovascular: Bradycardia present  Murmur heard  Pulmonary/Chest: Effort normal  No respiratory distress  He has decreased breath sounds  He has wheezes  Faint wheezes   Abdominal: Soft  Bowel sounds are normal  He exhibits no distension  There is no tenderness  Obese   Musculoskeletal: Normal range of motion  He exhibits edema  Trace edema right greater than left   Neurological: He is alert and oriented to person, place, and time  No cranial nerve deficit  Skin: Skin is warm and dry  prevelon boots   Psychiatric: He has a normal mood and affect  His behavior is normal    Vitals reviewed  Additional Data:     Labs:      Results from last 7 days  Lab Units 09/26/17  0618 09/25/17  1735   WBC Thousand/uL 5 71 5 45   HEMOGLOBIN g/dL 11 7* 12 2   HEMATOCRIT % 36 2* 37 2   PLATELETS Thousands/uL 238 253   NEUTROS PCT %  --  54   LYMPHS PCT %  --  31   MONOS PCT %  --  11   EOS PCT %  --  3       Results from last 7 days  Lab Units 09/27/17  0506  09/25/17  1735   SODIUM mmol/L 137  < > 139   POTASSIUM mmol/L 3 8  < > 4 6   CHLORIDE mmol/L 105  < > 108   CO2 mmol/L 23  < > 22   BUN mg/dL 31*  < > 30*   CREATININE mg/dL 1 58*  < > 1 51*   CALCIUM mg/dL 9 2  < > 9 4   TOTAL PROTEIN g/dL  --   --  7 4   BILIRUBIN TOTAL mg/dL  --   --  0 64   ALK PHOS U/L  --   --  126*   ALT U/L  --   --  73   AST U/L  --   --  79*   GLUCOSE RANDOM mg/dL 90  < > 94   < > = values in this interval not displayed  * I Have Reviewed All Lab Data Listed Above  * Additional Pertinent Lab Tests Reviewed:  All Labs Within Last 24 Hours Reviewed    Imaging:    Imaging Reports Reviewed Today Include:  None  Imaging Personally Reviewed by Myself Includes:  None    Recent Cultures (last 7 days):           Last 24 Hours Medication List:     allopurinol 100 mg Oral Daily   ALPRAZolam 0 5 mg Oral HS   amiodarone 200 mg Oral BID   aspirin 81 mg Oral Daily   atorvastatin 40 mg Oral Daily   carvedilol 3 125 mg Oral BID With Meals   docusate sodium 100 mg Oral BID   enoxaparin 40 mg Subcutaneous Daily   furosemide 40 mg Intravenous BID (diuretic)   levothyroxine 50 mcg Oral Early Morning melatonin 12 mg Oral HS   pantoprazole 20 mg Oral Early Morning   potassium chloride 40 mEq Oral Daily   ramipril 5 mg Oral Daily   ranolazine 500 mg Oral BID   senna 1 tablet Oral HS        Today, Patient Was Seen By: PATTIE Burch

## 2017-09-28 LAB
ANION GAP SERPL CALCULATED.3IONS-SCNC: 6 MMOL/L (ref 4–13)
BUN SERPL-MCNC: 29 MG/DL (ref 5–25)
CALCIUM SERPL-MCNC: 9.2 MG/DL (ref 8.3–10.1)
CHLORIDE SERPL-SCNC: 107 MMOL/L (ref 100–108)
CO2 SERPL-SCNC: 25 MMOL/L (ref 21–32)
CREAT SERPL-MCNC: 1.41 MG/DL (ref 0.6–1.3)
GFR SERPL CREATININE-BSD FRML MDRD: 44 ML/MIN/1.73SQ M
GLUCOSE SERPL-MCNC: 93 MG/DL (ref 65–140)
POTASSIUM SERPL-SCNC: 3.6 MMOL/L (ref 3.5–5.3)
SODIUM SERPL-SCNC: 138 MMOL/L (ref 136–145)

## 2017-09-28 PROCEDURE — 80048 BASIC METABOLIC PNL TOTAL CA: CPT | Performed by: NURSE PRACTITIONER

## 2017-09-28 RX ORDER — TORSEMIDE 20 MG/1
20 TABLET ORAL ONCE
Status: COMPLETED | OUTPATIENT
Start: 2017-09-29 | End: 2017-09-29

## 2017-09-28 RX ADMIN — ASPIRIN 81 MG 81 MG: 81 TABLET ORAL at 08:24

## 2017-09-28 RX ADMIN — DOCUSATE SODIUM 100 MG: 100 CAPSULE, LIQUID FILLED ORAL at 17:24

## 2017-09-28 RX ADMIN — FUROSEMIDE 40 MG: 10 INJECTION, SOLUTION INTRAMUSCULAR; INTRAVENOUS at 17:24

## 2017-09-28 RX ADMIN — FUROSEMIDE 40 MG: 10 INJECTION, SOLUTION INTRAMUSCULAR; INTRAVENOUS at 08:24

## 2017-09-28 RX ADMIN — AMIODARONE HYDROCHLORIDE 200 MG: 200 TABLET ORAL at 17:24

## 2017-09-28 RX ADMIN — DOCUSATE SODIUM 100 MG: 100 CAPSULE, LIQUID FILLED ORAL at 08:24

## 2017-09-28 RX ADMIN — RAMIPRIL 5 MG: 5 CAPSULE ORAL at 08:24

## 2017-09-28 RX ADMIN — CARVEDILOL 3.12 MG: 3.12 TABLET, FILM COATED ORAL at 17:24

## 2017-09-28 RX ADMIN — ALPRAZOLAM 0.5 MG: 0.5 TABLET ORAL at 21:31

## 2017-09-28 RX ADMIN — RANOLAZINE 500 MG: 500 TABLET, FILM COATED, EXTENDED RELEASE ORAL at 08:24

## 2017-09-28 RX ADMIN — ALLOPURINOL 100 MG: 100 TABLET ORAL at 08:24

## 2017-09-28 RX ADMIN — LEVOTHYROXINE SODIUM 50 MCG: 88 TABLET ORAL at 05:50

## 2017-09-28 RX ADMIN — ENOXAPARIN SODIUM 40 MG: 40 INJECTION SUBCUTANEOUS at 08:24

## 2017-09-28 RX ADMIN — POTASSIUM CHLORIDE 40 MEQ: 750 TABLET, EXTENDED RELEASE ORAL at 08:24

## 2017-09-28 RX ADMIN — CARVEDILOL 3.12 MG: 3.12 TABLET, FILM COATED ORAL at 08:24

## 2017-09-28 RX ADMIN — AMIODARONE HYDROCHLORIDE 200 MG: 200 TABLET ORAL at 08:24

## 2017-09-28 RX ADMIN — MELATONIN TAB 3 MG 12 MG: 3 TAB at 21:31

## 2017-09-28 RX ADMIN — RANOLAZINE 500 MG: 500 TABLET, FILM COATED, EXTENDED RELEASE ORAL at 17:24

## 2017-09-28 RX ADMIN — PANTOPRAZOLE SODIUM 20 MG: 20 TABLET, DELAYED RELEASE ORAL at 05:51

## 2017-09-28 RX ADMIN — SENNOSIDES 8.6 MG: 8.6 TABLET, FILM COATED ORAL at 21:31

## 2017-09-28 RX ADMIN — ATORVASTATIN CALCIUM 40 MG: 40 TABLET, FILM COATED ORAL at 08:24

## 2017-09-28 NOTE — ASSESSMENT & PLAN NOTE
· Present on admission as evidenced by edema, persisting cough, JVD noted by cardiologist during an outpatient visit to have ICD battery updated  · Chest x-ray with mild pulmonary vascular congestion, BNP 1661  · Cardiology following, changed to oral diuretics starting tomorrow  · Continue beta-blocker  · Weight stable    · Continue I & O and daily weight

## 2017-09-28 NOTE — PROGRESS NOTES
Progress Note - Msgnr Madelyn Pickering 80 y o  male MRN: [id number]    [facility]: [id number]        Ischemic cardiomyopathy status post ICD   Assessment & Plan    · EP plan for generator change on Friday        * Acute on chronic combined systolic and diastolic (congestive) heart failure   Assessment & Plan    · Present on admission as evidenced by edema, persisting cough, JVD noted by cardiologist during an outpatient visit to have ICD battery updated  · Chest x-ray with mild pulmonary vascular congestion, BNP 1661  · Cardiology following, changed to oral diuretics starting tomorrow  · Continue beta-blocker  · Weight stable    · Continue I & O and daily weight        Hyperlipidemia   Assessment & Plan    · Continue statin        Paroxysmal atrial fibrillation   Assessment & Plan    · Paced rhythm heart rates 50-60s  · Continue amiodarone, beta-blocker, aspirin  · No anticoagulation secondary to high fall risk        CAD (coronary artery disease)    Assessment & Plan    · Continue statin, beta-blocker, aspirin        Acute kidney injury on Chronic kidney disease (CKD), stage III (moderate)   Assessment & Plan    · Patient has followed with Nephrology in the past with last follow-up progress note noted in February of 2017  His baseline document at that time was 2 1-2 4  · Patient remains below baseline  · Plan to change to oral diuretics tomorrow  · Monitor intake and output   · Continue to monitor with with IV diuretics, monitor for hypovolemia        Decubitus ulcer of sacral region, stage 1   Assessment & Plan    · Present on admission  · Follow up wound care recommendations        Hypothyroidism   Assessment & Plan    · TSH 2 340  · Continue levothyroxine              VTE Pharmacologic Prophylaxis:   Pharmacologic: Enoxaparin (Lovenox)  Mechanical VTE Prophylaxis in Place: No    Patient Centered Rounds: I have performed bedside rounds with nursing staff today      Discussions with Specialists or Other Care Team Provider:     Education and Discussions with Family / Patient: patient    Time Spent for Care: 20 minutes  More than 50% of total time spent on counseling and coordination of care as described above  Current Length of Stay: 2 day(s)    Current Patient Status: Inpatient   Certification Statement: The patient will continue to require additional inpatient hospital stay due to plan for ICD generator change tomorrow, and remains on IV diuretics for CHF    Discharge Plan: return to [facility] likely Saturday if cleared by cardiology    Code Status: Level 1 - Full Code      Subjective:   Denies CP, SOB  Has slight cough  No HA or dizziness  Objective:     Vitals:   Temp (24hrs), Av 8 °F (36 6 °C), Min:97 6 °F (36 4 °C), Max:97 9 °F (36 6 °C)    HR:  [54-56] 55  Resp:  [15-20] 20  BP: ()/(55-74) 140/74  SpO2:  [97 %-99 %] 97 %  Body mass index is 33 88 kg/m²  Input and Output Summary (last 24 hours): Intake/Output Summary (Last 24 hours) at 17 1706  Last data filed at 17 1300   Gross per 24 hour   Intake              120 ml   Output              950 ml   Net             -830 ml       Physical Exam:     Physical Exam   Constitutional: He is oriented to person, place, and time  He appears well-developed and well-nourished  No distress  HENT:   Head: Normocephalic  Cardiovascular: Normal rate  Murmur heard  Pulmonary/Chest: Effort normal  No respiratory distress  He has decreased breath sounds  Abdominal: Soft  Bowel sounds are normal  He exhibits no distension  There is no tenderness  Musculoskeletal: Normal range of motion  He exhibits no edema  Neurological: He is alert and oriented to person, place, and time  No cranial nerve deficit  Skin: Skin is warm and dry  Psychiatric: He has a normal mood and affect  His behavior is normal    Vitals reviewed        Additional Data:     Labs:      Results from last 7 days  Lab Units 17  2840 09/25/17  1735   WBC Thousand/uL 5 71 5 45   HEMOGLOBIN g/dL 11 7* 12 2   HEMATOCRIT % 36 2* 37 2   PLATELETS Thousands/uL 238 253   NEUTROS PCT %  --  54   LYMPHS PCT %  --  31   MONOS PCT %  --  11   EOS PCT %  --  3       Results from last 7 days  Lab Units 09/28/17  0447  09/25/17  1735   SODIUM mmol/L 138  < > 139   POTASSIUM mmol/L 3 6  < > 4 6   CHLORIDE mmol/L 107  < > 108   CO2 mmol/L 25  < > 22   BUN mg/dL 29*  < > 30*   CREATININE mg/dL 1 41*  < > 1 51*   CALCIUM mg/dL 9 2  < > 9 4   TOTAL PROTEIN g/dL  --   --  7 4   BILIRUBIN TOTAL mg/dL  --   --  0 64   ALK PHOS U/L  --   --  126*   ALT U/L  --   --  73   AST U/L  --   --  79*   GLUCOSE RANDOM mg/dL 93  < > 94   < > = values in this interval not displayed  * I Have Reviewed All Lab Data Listed Above  * Additional Pertinent Lab Tests Reviewed: All Labs Within Last 24 Hours Reviewed    Imaging:    Imaging Reports Reviewed Today Include: none  Imaging Personally Reviewed by Myself Includes:  none    Recent Cultures (last 7 days):           Last 24 Hours Medication List:     allopurinol 100 mg Oral Daily   ALPRAZolam 0 5 mg Oral HS   amiodarone 200 mg Oral BID   aspirin 81 mg Oral Daily   atorvastatin 40 mg Oral Daily   carvedilol 3 125 mg Oral BID With Meals   docusate sodium 100 mg Oral BID   furosemide 40 mg Intravenous BID (diuretic)   levothyroxine 50 mcg Oral Early Morning   melatonin 12 mg Oral HS   pantoprazole 20 mg Oral Early Morning   potassium chloride 40 mEq Oral Daily   ramipril 5 mg Oral Daily   ranolazine 500 mg Oral BID   senna 1 tablet Oral HS   [START ON 9/29/2017] torsemide 20 mg Oral Once        Today, Patient Was Seen By: PATTIE Hsu    ** Please Note: Dictation voice to text software may have been used in the creation of this document   **

## 2017-09-28 NOTE — ASSESSMENT & PLAN NOTE
· Patient has followed with Nephrology in the past with last follow-up progress note noted in February of 2017    His baseline document at that time was 2 1-2 4  · Patient remains below baseline  · Plan to change to oral diuretics tomorrow  · Monitor intake and output   · Continue to monitor with with IV diuretics, monitor for hypovolemia

## 2017-09-28 NOTE — SUBJECTIVE & OBJECTIVE
VTE Pharmacologic Prophylaxis:   Pharmacologic: Enoxaparin (Lovenox)  Mechanical VTE Prophylaxis in Place: No    Patient Centered Rounds: I have performed bedside rounds with nursing staff today  Discussions with Specialists or Other Care Team Provider:     Education and Discussions with Family / Patient: patient    Time Spent for Care: 20 minutes  More than 50% of total time spent on counseling and coordination of care as described above  Current Length of Stay: 2 day(s)    Current Patient Status: Inpatient   Certification Statement: The patient will continue to require additional inpatient hospital stay due to plan for ICD generator change tomorrow, and remains on IV diuretics for CHF    Discharge Plan: return to [facility] likely Saturday if cleared by cardiology    Code Status: Level 1 - Full Code      Subjective:   Denies CP, SOB  Has slight cough  No HA or dizziness  Objective:     Vitals:   Temp (24hrs), Av 8 °F (36 6 °C), Min:97 6 °F (36 4 °C), Max:97 9 °F (36 6 °C)    HR:  [54-56] 55  Resp:  [15-20] 20  BP: ()/(55-74) 140/74  SpO2:  [97 %-99 %] 97 %  Body mass index is 33 88 kg/m²  Input and Output Summary (last 24 hours): Intake/Output Summary (Last 24 hours) at 17 1706  Last data filed at 17 1300   Gross per 24 hour   Intake              120 ml   Output              950 ml   Net             -830 ml       Physical Exam:     Physical Exam   Constitutional: He is oriented to person, place, and time  He appears well-developed and well-nourished  No distress  HENT:   Head: Normocephalic  Cardiovascular: Normal rate  Murmur heard  Pulmonary/Chest: Effort normal  No respiratory distress  He has decreased breath sounds  Abdominal: Soft  Bowel sounds are normal  He exhibits no distension  There is no tenderness  Musculoskeletal: Normal range of motion  He exhibits no edema  Neurological: He is alert and oriented to person, place, and time  No cranial nerve deficit  Skin: Skin is warm and dry  Psychiatric: He has a normal mood and affect  His behavior is normal    Vitals reviewed  Additional Data:     Labs:      Results from last 7 days  Lab Units 09/26/17  0618 09/25/17  1735   WBC Thousand/uL 5 71 5 45   HEMOGLOBIN g/dL 11 7* 12 2   HEMATOCRIT % 36 2* 37 2   PLATELETS Thousands/uL 238 253   NEUTROS PCT %  --  54   LYMPHS PCT %  --  31   MONOS PCT %  --  11   EOS PCT %  --  3       Results from last 7 days  Lab Units 09/28/17  0447  09/25/17  1735   SODIUM mmol/L 138  < > 139   POTASSIUM mmol/L 3 6  < > 4 6   CHLORIDE mmol/L 107  < > 108   CO2 mmol/L 25  < > 22   BUN mg/dL 29*  < > 30*   CREATININE mg/dL 1 41*  < > 1 51*   CALCIUM mg/dL 9 2  < > 9 4   TOTAL PROTEIN g/dL  --   --  7 4   BILIRUBIN TOTAL mg/dL  --   --  0 64   ALK PHOS U/L  --   --  126*   ALT U/L  --   --  73   AST U/L  --   --  79*   GLUCOSE RANDOM mg/dL 93  < > 94   < > = values in this interval not displayed  * I Have Reviewed All Lab Data Listed Above  * Additional Pertinent Lab Tests Reviewed:  All Labs Within Last 24 Hours Reviewed    Imaging:    Imaging Reports Reviewed Today Include: none  Imaging Personally Reviewed by Myself Includes:  none    Recent Cultures (last 7 days):           Last 24 Hours Medication List:     allopurinol 100 mg Oral Daily   ALPRAZolam 0 5 mg Oral HS   amiodarone 200 mg Oral BID   aspirin 81 mg Oral Daily   atorvastatin 40 mg Oral Daily   carvedilol 3 125 mg Oral BID With Meals   docusate sodium 100 mg Oral BID   furosemide 40 mg Intravenous BID (diuretic)   levothyroxine 50 mcg Oral Early Morning   melatonin 12 mg Oral HS   pantoprazole 20 mg Oral Early Morning   potassium chloride 40 mEq Oral Daily   ramipril 5 mg Oral Daily   ranolazine 500 mg Oral BID   senna 1 tablet Oral HS   [START ON 9/29/2017] torsemide 20 mg Oral Once        Today, Patient Was Seen By: PATTIE Milian    ** Please Note: Dictation voice to text software may have been used in the creation of this document   **

## 2017-09-28 NOTE — PROGRESS NOTES
Progress Note - Cardiology   Msgnr Madelyn Pickering 80 y o  male MRN: [id number]  [facility] Encounter: [id number]    Assessment and Plan:  Acute on chronic CHF with combined systolic and diastolic dysfunction  Cont IV diuresis and plan switch to oral, pt was on tosemide 10-20mg outpatient   Negative fluid balance approx -1L  Daily weights, fluid restrict, no salt cardiac diet   Monitor I/O  Cont ramipril 5mg daily     Coronary artery disease status post MI  No chest pain   Cont aspirin 81mg, lipitor 40mg daily   On ranexa 500mg q 12hrs     Ischemic cardiomyopathy with biventricular pacer/ICD  Paced rhythm, gen to be switched before discharge     Paroxysmal AFib not on anticoagulation  Pt deemed fall risk, in paced rhythm         Subjective/Objective     Subjective: Pt sleeping almost flat in bed  No issues overnight  Denies breathing problems, still has cough  NO chest pain       Objective:    Vitals: /63   Pulse 56   Temp 97 9 °F (36 6 °C) (Axillary)   Resp 15   Ht 6' (1 829 m)   Wt 112 kg (247 lb 12 8 oz)   SpO2 99%   BMI 33 61 kg/m²   Vitals:    09/26/17 0037 09/27/17 0600   Weight: 111 kg (243 lb 13 3 oz) 112 kg (247 lb 12 8 oz)     Orthostatic Blood Pressures    Flowsheet Row Most Recent Value   Blood Pressure  127/63 filed at 09/27/2017 2350   Patient Position - Orthostatic VS  Lying filed at 09/27/2017 2350            Intake/Output Summary (Last 24 hours) at 09/28/17 3569  Last data filed at 09/27/17 2124   Gross per 24 hour   Intake              120 ml   Output             1019 ml   Net             -899 ml       Invasive Devices     Peripheral Intravenous Line            Peripheral IV 09/27/17 Left Antecubital less than 1 day              Exam:  Sleeping flat in bed, appears comfortable though coughs occasionally   Normal conjunctivae  Crackles bilat with end expiratory wheeze, no increased work of breathing  S1 andS2 without murmur, +1 pitting edema bilat LE  Distal perfusion to ext intact, warm dry     Lab Results:   CBC with diff:   Results from last 7 days  Lab Units 09/26/17  0618   WBC Thousand/uL 5 71   RBC Million/uL 3 68*   HEMOGLOBIN g/dL 11 7*   HEMATOCRIT % 36 2*   MCV fL 98   MCH pg 31 8   MCHC g/dL 32 3   RDW % 14 2   MPV fL 10 9   PLATELETS Thousands/uL 238     CMP:   Results from last 7 days  Lab Units 09/28/17  0447  09/25/17  1735   SODIUM mmol/L 138  < > 139   POTASSIUM mmol/L 3 6  < > 4 6   CHLORIDE mmol/L 107  < > 108   CO2 mmol/L 25  < > 22   ANION GAP mmol/L 6  < > 9   BUN mg/dL 29*  < > 30*   CREATININE mg/dL 1 41*  < > 1 51*   GLUCOSE RANDOM mg/dL 93  < > 94   CALCIUM mg/dL 9 2  < > 9 4   AST U/L  --   --  79*   ALT U/L  --   --  73   ALK PHOS U/L  --   --  126*   TOTAL PROTEIN g/dL  --   --  7 4   ALBUMIN g/dL  --   --  2 9*   BILIRUBIN TOTAL mg/dL  --   --  0 64   EGFR ml/min/1 73sq m 44  < > 41   < > = values in this interval not displayed  Imaging: I have personally reviewed pertinent reports      VTE Pharmacologic Prophylaxis: Sequential compression device (Venodyne)  and Enoxaparin (Lovenox)  VTE Mechanical Prophylaxis: sequential compression device

## 2017-09-29 ENCOUNTER — ANESTHESIA EVENT (INPATIENT)
Dept: NON INVASIVE DIAGNOSTICS | Facility: HOSPITAL | Age: 82
DRG: 245 | End: 2017-09-29
Payer: MEDICARE

## 2017-09-29 ENCOUNTER — APPOINTMENT (OUTPATIENT)
Dept: NON INVASIVE DIAGNOSTICS | Facility: HOSPITAL | Age: 82
DRG: 245 | End: 2017-09-29
Attending: INTERNAL MEDICINE
Payer: MEDICARE

## 2017-09-29 PROBLEM — R79.89 ELEVATED LFTS: Status: ACTIVE | Noted: 2017-09-29

## 2017-09-29 LAB
ALBUMIN SERPL BCP-MCNC: 2.7 G/DL (ref 3.5–5)
ALP SERPL-CCNC: 169 U/L (ref 46–116)
ALT SERPL W P-5'-P-CCNC: 88 U/L (ref 12–78)
ANION GAP SERPL CALCULATED.3IONS-SCNC: 8 MMOL/L (ref 4–13)
AST SERPL W P-5'-P-CCNC: 109 U/L (ref 5–45)
BASOPHILS # BLD AUTO: 0.05 THOUSANDS/ΜL (ref 0–0.1)
BASOPHILS NFR BLD AUTO: 1 % (ref 0–1)
BILIRUB SERPL-MCNC: 0.49 MG/DL (ref 0.2–1)
BUN SERPL-MCNC: 28 MG/DL (ref 5–25)
CALCIUM SERPL-MCNC: 9.2 MG/DL (ref 8.3–10.1)
CHLORIDE SERPL-SCNC: 105 MMOL/L (ref 100–108)
CO2 SERPL-SCNC: 25 MMOL/L (ref 21–32)
CREAT SERPL-MCNC: 1.65 MG/DL (ref 0.6–1.3)
EOSINOPHIL # BLD AUTO: 0.22 THOUSAND/ΜL (ref 0–0.61)
EOSINOPHIL NFR BLD AUTO: 4 % (ref 0–6)
ERYTHROCYTE [DISTWIDTH] IN BLOOD BY AUTOMATED COUNT: 14.2 % (ref 11.6–15.1)
GFR SERPL CREATININE-BSD FRML MDRD: 37 ML/MIN/1.73SQ M
GLUCOSE SERPL-MCNC: 97 MG/DL (ref 65–140)
HCT VFR BLD AUTO: 36.9 % (ref 36.5–49.3)
HGB BLD-MCNC: 12.3 G/DL (ref 12–17)
LYMPHOCYTES # BLD AUTO: 1.6 THOUSANDS/ΜL (ref 0.6–4.47)
LYMPHOCYTES NFR BLD AUTO: 30 % (ref 14–44)
MCH RBC QN AUTO: 32.5 PG (ref 26.8–34.3)
MCHC RBC AUTO-ENTMCNC: 33.3 G/DL (ref 31.4–37.4)
MCV RBC AUTO: 98 FL (ref 82–98)
MONOCYTES # BLD AUTO: 0.7 THOUSAND/ΜL (ref 0.17–1.22)
MONOCYTES NFR BLD AUTO: 13 % (ref 4–12)
NEUTROPHILS # BLD AUTO: 2.8 THOUSANDS/ΜL (ref 1.85–7.62)
NEUTS SEG NFR BLD AUTO: 52 % (ref 43–75)
NRBC BLD AUTO-RTO: 0 /100 WBCS
PLATELET # BLD AUTO: 261 THOUSANDS/UL (ref 149–390)
PMV BLD AUTO: 10.9 FL (ref 8.9–12.7)
POTASSIUM SERPL-SCNC: 3.9 MMOL/L (ref 3.5–5.3)
PROT SERPL-MCNC: 7 G/DL (ref 6.4–8.2)
RBC # BLD AUTO: 3.78 MILLION/UL (ref 3.88–5.62)
SODIUM SERPL-SCNC: 138 MMOL/L (ref 136–145)
WBC # BLD AUTO: 5.4 THOUSAND/UL (ref 4.31–10.16)

## 2017-09-29 PROCEDURE — C1882 AICD, OTHER THAN SING/DUAL: HCPCS

## 2017-09-29 PROCEDURE — 80053 COMPREHEN METABOLIC PANEL: CPT | Performed by: INTERNAL MEDICINE

## 2017-09-29 PROCEDURE — 33264 RMVL & RPLCMT DFB GEN MLT LD: CPT

## 2017-09-29 PROCEDURE — 85025 COMPLETE CBC W/AUTO DIFF WBC: CPT | Performed by: NURSE PRACTITIONER

## 2017-09-29 RX ORDER — VANCOMYCIN HYDROCHLORIDE 1 G/200ML
1000 INJECTION, SOLUTION INTRAVENOUS ONCE
Status: COMPLETED | OUTPATIENT
Start: 2017-09-29 | End: 2017-09-29

## 2017-09-29 RX ORDER — SODIUM CHLORIDE 9 MG/ML
INJECTION, SOLUTION INTRAVENOUS CONTINUOUS PRN
Status: DISCONTINUED | OUTPATIENT
Start: 2017-09-29 | End: 2017-09-29 | Stop reason: SURG

## 2017-09-29 RX ORDER — LIDOCAINE HYDROCHLORIDE 10 MG/ML
INJECTION, SOLUTION INFILTRATION; PERINEURAL CODE/TRAUMA/SEDATION MEDICATION
Status: COMPLETED | OUTPATIENT
Start: 2017-09-29 | End: 2017-09-29

## 2017-09-29 RX ORDER — PROPOFOL 10 MG/ML
INJECTION, EMULSION INTRAVENOUS CONTINUOUS PRN
Status: DISCONTINUED | OUTPATIENT
Start: 2017-09-29 | End: 2017-09-29 | Stop reason: SURG

## 2017-09-29 RX ORDER — GENTAMICIN SULFATE 40 MG/ML
INJECTION, SOLUTION INTRAMUSCULAR; INTRAVENOUS CODE/TRAUMA/SEDATION MEDICATION
Status: COMPLETED | OUTPATIENT
Start: 2017-09-29 | End: 2017-09-29

## 2017-09-29 RX ADMIN — ALLOPURINOL 100 MG: 100 TABLET ORAL at 08:07

## 2017-09-29 RX ADMIN — ALPRAZOLAM 0.5 MG: 0.5 TABLET ORAL at 21:19

## 2017-09-29 RX ADMIN — GENTAMICIN SULFATE 80 MG: 40 INJECTION, SOLUTION INTRAMUSCULAR; INTRAVENOUS at 10:27

## 2017-09-29 RX ADMIN — TORSEMIDE 20 MG: 20 TABLET ORAL at 08:08

## 2017-09-29 RX ADMIN — MELATONIN TAB 3 MG 12 MG: 3 TAB at 21:19

## 2017-09-29 RX ADMIN — POTASSIUM CHLORIDE 40 MEQ: 750 TABLET, EXTENDED RELEASE ORAL at 08:08

## 2017-09-29 RX ADMIN — CARVEDILOL 3.12 MG: 3.12 TABLET, FILM COATED ORAL at 18:23

## 2017-09-29 RX ADMIN — LIDOCAINE HYDROCHLORIDE 20 ML: 10 INJECTION, SOLUTION INFILTRATION; PERINEURAL at 10:15

## 2017-09-29 RX ADMIN — ATORVASTATIN CALCIUM 40 MG: 40 TABLET, FILM COATED ORAL at 08:07

## 2017-09-29 RX ADMIN — ACETAMINOPHEN 325 MG: 325 TABLET, FILM COATED ORAL at 23:46

## 2017-09-29 RX ADMIN — ASPIRIN 81 MG 81 MG: 81 TABLET ORAL at 08:08

## 2017-09-29 RX ADMIN — RAMIPRIL 5 MG: 5 CAPSULE ORAL at 08:09

## 2017-09-29 RX ADMIN — PROPOFOL 60 MCG/KG/MIN: 10 INJECTION, EMULSION INTRAVENOUS at 10:05

## 2017-09-29 RX ADMIN — AMIODARONE HYDROCHLORIDE 200 MG: 200 TABLET ORAL at 08:07

## 2017-09-29 RX ADMIN — FUROSEMIDE 40 MG: 10 INJECTION, SOLUTION INTRAMUSCULAR; INTRAVENOUS at 17:19

## 2017-09-29 RX ADMIN — VANCOMYCIN HYDROCHLORIDE 1000 MG: 1 INJECTION, SOLUTION INTRAVENOUS at 09:19

## 2017-09-29 RX ADMIN — RANOLAZINE 500 MG: 500 TABLET, FILM COATED, EXTENDED RELEASE ORAL at 18:23

## 2017-09-29 RX ADMIN — RANOLAZINE 500 MG: 500 TABLET, FILM COATED, EXTENDED RELEASE ORAL at 08:09

## 2017-09-29 RX ADMIN — LIDOCAINE HYDROCHLORIDE 5 ML: 10 INJECTION, SOLUTION INFILTRATION; PERINEURAL at 10:17

## 2017-09-29 RX ADMIN — FUROSEMIDE 40 MG: 10 INJECTION, SOLUTION INTRAMUSCULAR; INTRAVENOUS at 08:08

## 2017-09-29 RX ADMIN — AMIODARONE HYDROCHLORIDE 200 MG: 200 TABLET ORAL at 17:19

## 2017-09-29 RX ADMIN — DOCUSATE SODIUM 100 MG: 100 CAPSULE, LIQUID FILLED ORAL at 08:08

## 2017-09-29 RX ADMIN — CARVEDILOL 3.12 MG: 3.12 TABLET, FILM COATED ORAL at 08:07

## 2017-09-29 RX ADMIN — DOCUSATE SODIUM 100 MG: 100 CAPSULE, LIQUID FILLED ORAL at 17:19

## 2017-09-29 RX ADMIN — SODIUM CHLORIDE: 0.9 INJECTION, SOLUTION INTRAVENOUS at 09:45

## 2017-09-29 RX ADMIN — VANCOMYCIN HYDROCHLORIDE 1000 MG: 1 INJECTION, SOLUTION INTRAVENOUS at 09:58

## 2017-09-29 NOTE — ASSESSMENT & PLAN NOTE
· Acute on chronic combined systolic and diastolic CHF, POA,  as evidenced by edema, persisting cough, JVD  · BNP 1661  · Cardiology following, appreciate input  Patient receiving Lasix 40 mg IV BID, consider changing to oral diuretic today  · Continue Carvedilol and ramipril  · Weight stable  · Continue to monitor intake/ output and daily weight

## 2017-09-29 NOTE — PROGRESS NOTES
Pt back from cath lab, settled in room  Incision dressing clean, dry and intact  Patient without any complaints at this time  Requesting to eat lunch  Tray ordered  Post cath vitals initiated

## 2017-09-29 NOTE — ASSESSMENT & PLAN NOTE
· Paced rhythm heart rates 50-60s  · Continue amiodarone and beta-blocker  · No anticoagulation secondary to fall risk; continue daily aspirin

## 2017-09-29 NOTE — ASSESSMENT & PLAN NOTE
· , ALT 88, Alk phos elevated to 179  · LFTs elevated on admission, worsened since admission  · Patient denies abdominal pain  · Repeat CMP in AM; consider RUQ ultrasound

## 2017-09-29 NOTE — ASSESSMENT & PLAN NOTE
· Baseline Cr in February 2017 at the time of his last appointment with Nephrology was 2 1-2 4  · Patient remains below baseline at 1 65; increased from 1 41 yesterday  · Monitor intake and output   · Continue to monitor with with IV diuretics  Possible change to oral diuretic today

## 2017-09-29 NOTE — PROGRESS NOTES
Progress Note - Cardiology   Msgndunia Pro 80 y o  male MRN: [id number]  [facility] Encounter: [id number]      Assessment and Plan    Acute on chronic CHF with combined systolic and diastolic dysfunction  Switched to torsemide 20mg PO qday and will cont this   No dyspnea   Negative fluid balance and diuresing well      Coronary artery disease status post MI  Cont aspirin 81mg, lipitor 40mg daily   On ranexa 500mg q 12hrs      Ischemic cardiomyopathy with biventricular pacer/ICD  Pt down for generator change this AM      Paroxysmal AFib not on anticoagulation  Pt deemed fall risk, in paced rhythm       Subjective/Objective   Subjective: Pt back from generator switch  Persistent cough but no dyspnea or chest pain       Objective: General Appearance: awake  alert, no acute distress, comfortable eating lunch   Eyes: normal conjunctivae   Neck: supple, no JVD  Lungs: clear bilat, no crackles, , no labored breathing/accessory muscle use  Heart: regular rate and rhythm, S1, S2 normal, pacer gen replaced in dressing,   Abdomen: soft, nondistended,  sounds normal;   Extremities: no deformity, scant edema, improved from prior  Neuro: no  focal deficits   Skin: warm, dry, not diaphoretic        Vitals: /72   Pulse (!) 54   Temp 97 5 °F (36 4 °C) (Oral)   Resp 18   Ht 6' (1 829 m)   Wt 113 kg (249 lb 1 9 oz)   SpO2 98%   BMI 33 79 kg/m²   Vitals:    09/28/17 0600 09/29/17 0600   Weight: 113 kg (249 lb 12 5 oz) 113 kg (249 lb 1 9 oz)     Orthostatic Blood Pressures    Flowsheet Row Most Recent Value   Blood Pressure  116/72 filed at 09/29/2017 1230   Patient Position - Orthostatic VS  Lying filed at 09/29/2017 1230            Intake/Output Summary (Last 24 hours) at 09/29/17 1240  Last data filed at 09/29/17 1045   Gross per 24 hour   Intake              800 ml   Output             1100 ml   Net             -300 ml       Invasive Devices     Peripheral Intravenous Line            Peripheral IV 09/27/17 Left Antecubital 2 days                  Lab Results:   CBC with diff:   Results from last 7 days  Lab Units 09/29/17  0459   WBC Thousand/uL 5 40   RBC Million/uL 3 78*   HEMOGLOBIN g/dL 12 3   HEMATOCRIT % 36 9   MCV fL 98   MCH pg 32 5   MCHC g/dL 33 3   RDW % 14 2   MPV fL 10 9   PLATELETS Thousands/uL 261     CMP:   Results from last 7 days  Lab Units 09/29/17  0459   SODIUM mmol/L 138   POTASSIUM mmol/L 3 9   CHLORIDE mmol/L 105   CO2 mmol/L 25   ANION GAP mmol/L 8   BUN mg/dL 28*   CREATININE mg/dL 1 65*   GLUCOSE RANDOM mg/dL 97   CALCIUM mg/dL 9 2   AST U/L 109*   ALT U/L 88*   ALK PHOS U/L 169*   TOTAL PROTEIN g/dL 7 0   ALBUMIN g/dL 2 7*   BILIRUBIN TOTAL mg/dL 0 49   EGFR ml/min/1 73sq m 37     Imaging: I have personally reviewed pertinent reports      VTE Pharmacologic Prophylaxis: RX contraindicated due to: ruben-procedural period   VTE Mechanical Prophylaxis: sequential compression device

## 2017-09-29 NOTE — PROGRESS NOTES
Progress Note - Maria G Ramirez 80 y o  male MRN: [id number]    Unit/Bed#: [facility] 709-01 Encounter: [id number]        * Acute on chronic combined systolic and diastolic (congestive) heart failure   Assessment & Plan    · Acute on chronic combined systolic and diastolic CHF, POA,  as evidenced by edema, persisting cough, JVD  · BNP 1661  · Cardiology following, appreciate input  Patient receiving Lasix 40 mg IV BID, consider changing to oral diuretic today  · Continue Carvedilol and ramipril  · Weight stable  · Continue to monitor intake/ output and daily weight  Paroxysmal atrial fibrillation   Assessment & Plan    · Paced rhythm heart rates 50-60s  · Continue amiodarone and beta-blocker  · No anticoagulation secondary to fall risk; continue daily aspirin  Ischemic cardiomyopathy status post ICD   Assessment & Plan    · Generator change scheduled this AM          Acute kidney injury on Chronic kidney disease (CKD), stage III (moderate)   Assessment & Plan    · Baseline Cr in February 2017 at the time of his last appointment with Nephrology was 2 1-2 4  · Patient remains below baseline at 1 65; increased from 1 41 yesterday  · Monitor intake and output   · Continue to monitor with with IV diuretics  Possible change to oral diuretic today  Elevated LFTs   Assessment & Plan    · , ALT 88, Alk phos elevated to 179  · LFTs elevated on admission, worsened since admission  · Patient denies abdominal pain  · Repeat CMP in AM; consider RUQ ultrasound  Hypothyroidism   Assessment & Plan    · TSH 2 340  · Continue levothyroxine 50 mcg daily  Decubitus ulcer of sacral region, stage 1   Assessment & Plan    · Present on admission  · Follow wound care recommendations        Hyperlipidemia   Assessment & Plan    · Continue statin        CAD (coronary artery disease)    Assessment & Plan    · Patient denies chest pain    · Continue statin, beta-blocker and aspirin  · Continue Ranexa  VTE Pharmacologic Prophylaxis:   Pharmacologic: Pharmacologic VTE Prophylaxis contraindicated due to generator change  Mechanical VTE Prophylaxis in Place: Yes    Patient Centered Rounds: I have performed bedside rounds with nursing staff today  Discussions with Specialists or Other Care Team Provider: Nursing     Education and Discussions with Family / Patient: Patient    Time Spent for Care: 20 minutes  More than 50% of total time spent on counseling and coordination of care as described above  Current Length of Stay: 3 day(s)    Current Patient Status: Inpatient   Certification Statement: The patient will continue to require additional inpatient hospital stay due to generator change, IV Lasix for CHF    Discharge Plan: Possible d/c  if cleared by cardiology, changes to PO diuretics  Code Status: Level 1 - Full Code      Subjective:   Patient sitting up in bed, alert, no complaints at this time  Patient scheduled for generator change this AM  Patient denies chest pain or SOB  He reports that he did not sleep well and attributes this to his persistent, dry cough  Objective:     Vitals:   Temp (24hrs), Av °F (36 7 °C), Min:97 6 °F (36 4 °C), Max:98 3 °F (36 8 °C)    HR:  [53-89] 54  Resp:  [17-20] 18  BP: ()/(55-74) 131/59  SpO2:  [95 %-99 %] 96 %  Body mass index is 33 79 kg/m²  Input and Output Summary (last 24 hours): Intake/Output Summary (Last 24 hours) at 17 0826  Last data filed at 17 0435   Gross per 24 hour   Intake              600 ml   Output             1250 ml   Net             -650 ml       Physical Exam:     Physical Exam   Constitutional: No distress  HENT:   Head: Normocephalic  Eyes: Conjunctivae are normal    Neck: Neck supple  Cardiovascular: Normal rate and regular rhythm  Pulmonary/Chest: Effort normal  No respiratory distress  He has decreased breath sounds  Abdominal: Soft   Bowel sounds are normal  There is no tenderness  Neurological: He is alert  Skin: Skin is warm and dry  He is not diaphoretic  Psychiatric: He has a normal mood and affect  Nursing note and vitals reviewed  Additional Data:     Labs:      Results from last 7 days  Lab Units 09/29/17  0459   WBC Thousand/uL 5 40   HEMOGLOBIN g/dL 12 3   HEMATOCRIT % 36 9   PLATELETS Thousands/uL 261   NEUTROS PCT % 52   LYMPHS PCT % 30   MONOS PCT % 13*   EOS PCT % 4       Results from last 7 days  Lab Units 09/29/17  0459   SODIUM mmol/L 138   POTASSIUM mmol/L 3 9   CHLORIDE mmol/L 105   CO2 mmol/L 25   BUN mg/dL 28*   CREATININE mg/dL 1 65*   CALCIUM mg/dL 9 2   TOTAL PROTEIN g/dL 7 0   BILIRUBIN TOTAL mg/dL 0 49   ALK PHOS U/L 169*   ALT U/L 88*   AST U/L 109*   GLUCOSE RANDOM mg/dL 97           * I Have Reviewed All Lab Data Listed Above  * Additional Pertinent Lab Tests Reviewed: All Labs Within Last 24 Hours Reviewed    Imaging:    Imaging Reports Reviewed Today Include: None  Imaging Personally Reviewed by Myself Includes:  None    Recent Cultures (last 7 days):           Last 24 Hours Medication List:     allopurinol 100 mg Oral Daily   ALPRAZolam 0 5 mg Oral HS   amiodarone 200 mg Oral BID   aspirin 81 mg Oral Daily   atorvastatin 40 mg Oral Daily   carvedilol 3 125 mg Oral BID With Meals   docusate sodium 100 mg Oral BID   furosemide 40 mg Intravenous BID (diuretic)   levothyroxine 50 mcg Oral Early Morning   melatonin 12 mg Oral HS   pantoprazole 20 mg Oral Early Morning   potassium chloride 40 mEq Oral Daily   ramipril 5 mg Oral Daily   ranolazine 500 mg Oral BID   senna 1 tablet Oral HS   vancomycin 1,000 mg Intravenous Once        Today, Patient Was Seen By: Jenaro Díaz PA-C    ** Please Note: Dictation voice to text software may have been used in the creation of this document   **

## 2017-09-30 VITALS
RESPIRATION RATE: 18 BRPM | DIASTOLIC BLOOD PRESSURE: 53 MMHG | SYSTOLIC BLOOD PRESSURE: 98 MMHG | HEART RATE: 55 BPM | HEIGHT: 72 IN | WEIGHT: 249.12 LBS | OXYGEN SATURATION: 98 % | TEMPERATURE: 98.2 F | BODY MASS INDEX: 33.74 KG/M2

## 2017-09-30 LAB
ALBUMIN SERPL BCP-MCNC: 2.6 G/DL (ref 3.5–5)
ALP SERPL-CCNC: 125 U/L (ref 46–116)
ALT SERPL W P-5'-P-CCNC: 81 U/L (ref 12–78)
ANION GAP SERPL CALCULATED.3IONS-SCNC: 9 MMOL/L (ref 4–13)
AST SERPL W P-5'-P-CCNC: 92 U/L (ref 5–45)
BILIRUB SERPL-MCNC: 0.53 MG/DL (ref 0.2–1)
BUN SERPL-MCNC: 33 MG/DL (ref 5–25)
CALCIUM SERPL-MCNC: 9.2 MG/DL (ref 8.3–10.1)
CHLORIDE SERPL-SCNC: 104 MMOL/L (ref 100–108)
CO2 SERPL-SCNC: 23 MMOL/L (ref 21–32)
CREAT SERPL-MCNC: 1.71 MG/DL (ref 0.6–1.3)
ERYTHROCYTE [DISTWIDTH] IN BLOOD BY AUTOMATED COUNT: 13.9 % (ref 11.6–15.1)
GFR SERPL CREATININE-BSD FRML MDRD: 35 ML/MIN/1.73SQ M
GLUCOSE SERPL-MCNC: 101 MG/DL (ref 65–140)
GLUCOSE SERPL-MCNC: 108 MG/DL (ref 65–140)
HCT VFR BLD AUTO: 35 % (ref 36.5–49.3)
HGB BLD-MCNC: 11.9 G/DL (ref 12–17)
INR PPP: 1.29 (ref 0.86–1.16)
MCH RBC QN AUTO: 32.6 PG (ref 26.8–34.3)
MCHC RBC AUTO-ENTMCNC: 34 G/DL (ref 31.4–37.4)
MCV RBC AUTO: 96 FL (ref 82–98)
PLATELET # BLD AUTO: 257 THOUSANDS/UL (ref 149–390)
PMV BLD AUTO: 10.8 FL (ref 8.9–12.7)
POTASSIUM SERPL-SCNC: 3.7 MMOL/L (ref 3.5–5.3)
PROT SERPL-MCNC: 6.9 G/DL (ref 6.4–8.2)
PROTHROMBIN TIME: 16.2 SECONDS (ref 12.1–14.4)
RBC # BLD AUTO: 3.65 MILLION/UL (ref 3.88–5.62)
SODIUM SERPL-SCNC: 136 MMOL/L (ref 136–145)
WBC # BLD AUTO: 5.05 THOUSAND/UL (ref 4.31–10.16)

## 2017-09-30 PROCEDURE — 85027 COMPLETE CBC AUTOMATED: CPT | Performed by: PHYSICIAN ASSISTANT

## 2017-09-30 PROCEDURE — 85610 PROTHROMBIN TIME: CPT | Performed by: PHYSICIAN ASSISTANT

## 2017-09-30 PROCEDURE — 80053 COMPREHEN METABOLIC PANEL: CPT | Performed by: PHYSICIAN ASSISTANT

## 2017-09-30 PROCEDURE — 82948 REAGENT STRIP/BLOOD GLUCOSE: CPT

## 2017-09-30 RX ORDER — ALPRAZOLAM 0.5 MG/1
0.5 TABLET ORAL
Qty: 10 TABLET | Refills: 0 | Status: SHIPPED | OUTPATIENT
Start: 2017-09-30 | End: 2018-01-30

## 2017-09-30 RX ORDER — TORSEMIDE 20 MG/1
20 TABLET ORAL DAILY
Refills: 0
Start: 2017-09-30

## 2017-09-30 RX ADMIN — ALLOPURINOL 100 MG: 100 TABLET ORAL at 09:31

## 2017-09-30 RX ADMIN — AMIODARONE HYDROCHLORIDE 200 MG: 200 TABLET ORAL at 09:16

## 2017-09-30 RX ADMIN — FUROSEMIDE 40 MG: 10 INJECTION, SOLUTION INTRAMUSCULAR; INTRAVENOUS at 09:16

## 2017-09-30 RX ADMIN — ATORVASTATIN CALCIUM 40 MG: 40 TABLET, FILM COATED ORAL at 09:17

## 2017-09-30 RX ADMIN — DOCUSATE SODIUM 100 MG: 100 CAPSULE, LIQUID FILLED ORAL at 09:16

## 2017-09-30 RX ADMIN — POTASSIUM CHLORIDE 40 MEQ: 750 TABLET, EXTENDED RELEASE ORAL at 09:17

## 2017-09-30 RX ADMIN — CARVEDILOL 3.12 MG: 3.12 TABLET, FILM COATED ORAL at 09:17

## 2017-09-30 RX ADMIN — RANOLAZINE 500 MG: 500 TABLET, FILM COATED, EXTENDED RELEASE ORAL at 09:21

## 2017-09-30 RX ADMIN — PANTOPRAZOLE SODIUM 20 MG: 20 TABLET, DELAYED RELEASE ORAL at 05:01

## 2017-09-30 RX ADMIN — LEVOTHYROXINE SODIUM 50 MCG: 88 TABLET ORAL at 05:01

## 2017-09-30 RX ADMIN — RAMIPRIL 5 MG: 5 CAPSULE ORAL at 09:21

## 2017-09-30 RX ADMIN — ASPIRIN 81 MG 81 MG: 81 TABLET ORAL at 09:16

## 2017-09-30 NOTE — SOCIAL WORK
Pt medically cleared for d/c  CM called [facility] and notified Bhavana Heredia (RN) of d/c back to them, Report# [phone] ext: [phone], Fax# [phone]  CM- called [facility] spoke to Leny Odom, set up BLS transport for 2:30pm  Notified BEATA Min, gave her paperwork, notified [facility], and [place] [facility]  Andrew Corbin at [phone] of d/c

## 2017-09-30 NOTE — DISCHARGE SUMMARY
Discharge Summary - [address] [facility]    Patient Information: Maria G Pro 80 y o  male MRN: [id number]  [facility] Encounter: [id number]    Discharging Physician / Practitioner: Joseph Leblanc PA-C  PCP: Alexus Jaimes MD  Admission Date: 9/25/2017  Discharge Date: 09/30/17    Reason for Admission: SOB and lower extremity edema    Discharge Diagnoses:     Principal Problem:    Acute on chronic combined systolic and diastolic (congestive) heart failure  Active Problems:    Acute kidney injury on Chronic kidney disease (CKD), stage III (moderate)    Ischemic cardiomyopathy status post ICD    Paroxysmal atrial fibrillation    CAD (coronary artery disease)     Hyperlipidemia    Decubitus ulcer of sacral region, stage 1    Hypothyroidism    Elevated LFTs  Resolved Problems:    EDEN (acute kidney injury)    Essential hypertension      Consultations During Hospital Stay:  · Cardiology  · Electrophysiology     Procedures Performed:     · Biventricular ICD generator change  Significant Findings / Test Results:     · Chest x-ray: No acute abnormality in the chest     Incidental Findings:   · Elevated LFTs    Test Results Pending at Discharge (will require follow up): · None     Outpatient Tests Requested:  · Outpatient follow-up with PCP  · Outpatient follow-up with electrophysiology, cardiology, Dr Boo Naranjo  · Repeat CMP in 1 week to monitor LFTs  Complications:  None    Hospital Course:     Maria G Pro is a 80 y o  male patient with a history of ischemic cardiomyopathy, biventricular ICD, paroxysmal atrial fibrillation, CHF and CAD who originally presented to the hospital from [facility] on 9/25/2017 due to shortness of breath  Patient reportedly had shortness of breath and a cough for 1 month prior to admission  He presented to his cardiologist's office, Dr Boo Naranjo on the day of his admission for possible ICD generator change   His cardiologist reportedly noted that he was wheezing and had increased edema in his lower extremities associated with SOB and therefore sent him to the hospital for further work-up  Chest x-ray obtained in the ED showed no acute findings  The patient was admitted for acute on chronic combined systolic and diastolic CHF and was started on IV Lasix  Cardiology consult was obtained and the patient was maintained on IV Lasix until his symptoms improved and he was transitioned to torsemide 20 mg which he will continue on discharge  His weight is stable on day of discharge  Electrophysiology consult was also obtained and the patient underwent ICD generator change on 9/29/17  Patient is being discharged back to [facility]  He is to follow-up with cardiology as an outpatient  Of note, patient's LFTs were noted to be elevated during his admission  According to the patient's records, he has a prior history of elevated LFTs for which he was evaluated by GI, Dr Diogo Tinajero earlier this year  His LFT elevation at that time was suspected to be related to ischemic cardiomyopathy and a prior episode of hypotension  and his viral hepatitis panel, ultrasound were reportedly negative at that time  Patient's LFTs showed some improvement on day of discharge and he is to have a repeat CMP in 1 week to monitor his LFTs  If LFTs worsen, consider outpatient follow-up with GI and possibly holding statin as well  Condition at Discharge: stable     Discharge Day Visit / Exam:     Subjective:  Patient sitting up in bed, no events overnight, slept well  He continues to have a cough  He denies chest pain or SOB     Vitals: Blood Pressure: 120/63 (09/30/17 0758)  Pulse: 56 (09/30/17 0758)  Temperature: 98 °F (36 7 °C) (09/30/17 0758)  Temp Source: Oral (09/30/17 0758)  Respirations: 18 (09/30/17 0758)  Height: 6' (182 9 cm) (09/26/17 0037)  Weight - Scale: 113 kg (249 lb 1 9 oz) (09/30/17 0500)  SpO2: 97 % (09/30/17 0758)  Exam:   Physical Exam   Constitutional: No distress  HENT:   Head: Normocephalic  Eyes: Conjunctivae are normal    Neck: Normal range of motion  Cardiovascular: Normal rate and regular rhythm  Pulmonary/Chest: Effort normal and breath sounds normal  No respiratory distress  Abdominal: Soft  Bowel sounds are normal  There is no tenderness  Musculoskeletal: He exhibits no edema  Neurological: He is alert  Oriented to person and place; disoriented to time  Skin: Skin is warm and dry  He is not diaphoretic  No erythema  Psychiatric: He has a normal mood and affect  Nursing note and vitals reviewed  Discharge instructions/Information to patient and family:   See after visit summary for information provided to patient and family  Provisions for Follow-Up Care:  See after visit summary for information related to follow-up care and any pertinent home health orders  Disposition:     Iona Campuzano (see below)    For Discharges to [facility]:   · David Aviles MD - Not Applicable to this Patient    Planned Readmission: None     Discharge Statement:  I spent 35 minutes discharging the patient  This time was spent on the day of discharge  I had direct contact with the patient on the day of discharge  Greater than 50% of the total time was spent examining patient, answering all patient questions, arranging and discussing plan of care with patient as well as directly providing post-discharge instructions  Additional time then spent on discharge activities  Discharge Medications:  See after visit summary for reconciled discharge medications provided to patient and family        ** Please Note: This note has been constructed using a voice recognition system **

## 2017-09-30 NOTE — PROGRESS NOTES
Pt taken to [facility] via stretcher  Transporters with [facility] unable to take pt's own w/c  Charge nurse and  notified, w/c put in the garcia beside   Arrangements will be made by  to  pt's w/c

## 2017-09-30 NOTE — PROGRESS NOTES
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  Active Problems:    Acute kidney injury on Chronic kidney disease (CKD), stage III (moderate)    CAD (coronary artery disease)     Ischemic cardiomyopathy status post ICD    Paroxysmal atrial fibrillation    Hyperlipidemia    Decubitus ulcer of sacral region, stage 1    Hypothyroidism    Elevated LFTs    ICD generator change yesterday  Plan:    Generator pocket site c/d/i  No induration  Tele stable with AV paced rhythm  Remains euvolemic on oral diuretic therapy  D/W SLIM  [place] for DC  F u already arranged  Subjective:   Patient seen and examined  No significant events overnight        Meds/Allergies   Allergies   Allergen Reactions    Plavix [Clopidogrel Bisulfate]      current meds:   Current Facility-Administered Medications   Medication Dose Route Frequency    acetaminophen (TYLENOL) tablet 325 mg  325 mg Oral Q4H PRN    allopurinol (ZYLOPRIM) tablet 100 mg  100 mg Oral Daily    ALPRAZolam (XANAX) tablet 0 5 mg  0 5 mg Oral HS    amiodarone tablet 200 mg  200 mg Oral BID    aspirin chewable tablet 81 mg  81 mg Oral Daily    atorvastatin (LIPITOR) tablet 40 mg  40 mg Oral Daily    carvedilol (COREG) tablet 3 125 mg  3 125 mg Oral BID With Meals    docusate sodium (COLACE) capsule 100 mg  100 mg Oral BID    furosemide (LASIX) injection 40 mg  40 mg Intravenous BID (diuretic)    levothyroxine tablet 50 mcg  50 mcg Oral Early Morning    melatonin tablet 12 mg  12 mg Oral HS    pantoprazole (PROTONIX) EC tablet 20 mg  20 mg Oral Early Morning    potassium chloride (K-DUR,KLOR-CON) CR tablet 40 mEq  40 mEq Oral Daily    ramipril (ALTACE) capsule 5 mg  5 mg Oral Daily    ranolazine (RANEXA) 12 hr tablet 500 mg  500 mg Oral BID    senna (SENOKOT) tablet 8 6 mg  1 tablet Oral HS          Objective   Vitals: Blood pressure 120/63, pulse 56, temperature 98 °F (36 7 °C), temperature source Oral, resp  rate 18, height 6' (1 829 m), weight 113 kg (249 lb 1 9 oz), SpO2 97 %  , Body mass index is 33 79 kg/m² , Orthostatic Blood Pressures    Flowsheet Row Most Recent Value   Blood Pressure  120/63 filed at 09/30/2017 0758   Patient Position - Orthostatic VS  Sitting filed at 09/30/2017 3693        Systolic (76LXK), MBD:157 , Min:106 , HUU:882     Diastolic (68LTS), LTD:06, Min:54, Max:72      Intake/Output Summary (Last 24 hours) at 09/30/17 0948  Last data filed at 09/29/17 1801   Gross per 24 hour   Intake              600 ml   Output              700 ml   Net             -100 ml     Weight (last 2 days)     Date/Time   Weight    09/30/17 0500  113 (249 12)    09/29/17 0600  113 (249 12)    09/28/17 0600  113 (249 78)              No significant arrhythmias seen on telemetry review  AV paced      Physical Exam:    GEN: Maria G Robledo appears well, alert and oriented x 3, pleasant and cooperative   NECK: supple, no carotid bruits, no JVD or HJR  HEART: normal rate, regular rhythm, normal S1 and S2, no murmurs, clicks, gallops or rubs   LUNGS: clear to auscultation bilaterally; no wheezes, rales, or rhonchi   ABDOMEN: normal bowel sounds, soft, no tenderness, no distention  EXTREMITIES: peripheral pulses normal; no clubbing, cyanosis, or edema      Laboratory Results:    Results from last 7 days  Lab Units 09/25/17  1735   TROPONIN I ng/mL 0 02       CBC with diff:   Results from last 7 days  Lab Units 09/30/17  0605 09/29/17  0459 09/26/17  0618 09/25/17  1735   WBC Thousand/uL 5 05 5 40 5 71 5 45   HEMOGLOBIN g/dL 11 9* 12 3 11 7* 12 2   HEMATOCRIT % 35 0* 36 9 36 2* 37 2   MCV fL 96 98 98 99*   PLATELETS Thousands/uL 257 261 238 253   MCH pg 32 6 32 5 31 8 32 5   MCHC g/dL 34 0 33 3 32 3 32 8   RDW % 13 9 14 2 14 2 14 5   MPV fL 10 8 10 9 10 9 10 7   NRBC AUTO /100 WBCs  --  0  --  0         CMP:  Results from last 7 days  Lab Units 17  0605 17  0459 17  0447 17  0506 17  0618 17  1735   SODIUM mmol/L 136 138 138 137 139 139   POTASSIUM mmol/L 3 7 3 9 3 6 3 8 4 1 4 6   CHLORIDE mmol/L 104 105 107 105 105 108   CO2 mmol/L 23 25 25 23 26 22   ANION GAP mmol/L 9 8 6 9 8 9   BUN mg/dL 33* 28* 29* 31* 28* 30*   CREATININE mg/dL 1 71* 1 65* 1 41* 1 58* 1 44* 1 51*   GLUCOSE RANDOM mg/dL 108 97 93 90 91 94   CALCIUM mg/dL 9 2 9 2 9 2 9 2 9 2 9 4   AST U/L 92* 109*  --   --   --  79*   ALT U/L 81* 88*  --   --   --  73   ALK PHOS U/L 125* 169*  --   --   --  126*   TOTAL PROTEIN g/dL 6 9 7 0  --   --   --  7 4   ALBUMIN g/dL 2 6* 2 7*  --   --   --  2 9*   BILIRUBIN TOTAL mg/dL 0 53 0 49  --   --   --  0 64   EGFR ml/min/1 73sq m 35 37 44 39 43 41         BMP:  Results from last 7 days  Lab Units 17  0605 17  0459 177 17  0506 17  0618 17  1735   SODIUM mmol/L 136 138 138 137 139 139   POTASSIUM mmol/L 3 7 3 9 3 6 3 8 4 1 4 6   CHLORIDE mmol/L 104 105 107 105 105 108   CO2 mmol/L 23 25 25 23 26 22   BUN mg/dL 33* 28* 29* 31* 28* 30*   CREATININE mg/dL 1 71* 1 65* 1 41* 1 58* 1 44* 1 51*   GLUCOSE RANDOM mg/dL 108 97 93 90 91 94   CALCIUM mg/dL 9 2 9 2 9 2 9 2 9 2 9 4       Cardiac testing:   Results for orders placed during the hospital encounter of 16   Echo complete with contrast if indicated    Virginia Gross [address]  [address], [address] [facility]  [phone]    Transthoracic Echocardiogram  2D, M-mode, Doppler, and Color Doppler    Study date:  28-Dec-2016    Patient: Nguyen Andrade  MR number: [id number]  Account number: [de-identified]  : 06-Mar-1930  Age: 80 years  Gender: Male  Status: Inpatient  Location: Bedside  Height: 72 in  Weight: 224 6 lb  BP: 82/ 48 mmHg    Indications: Pacemaker    Diagnoses: R55  - Syncope and [facility]    Sonographer:  MATT Schmidt  Primary Physician:  Johanna Billy MD  Referring Physician:  Brenna Prado MD  Group:  [facility]  Cardiology Fellow:  Rose Reyna MD  Interpreting Physician:  Luis Armando Clement MD    SUMMARY    PROCEDURE INFORMATION:  This was a technically difficult study  Intravenous contrast was administered to opacify the left ventricle  LEFT VENTRICLE:  Systolic function was normal by visual assessment  Ejection fraction was  estimated to be 55 %  Although no diagnostic regional wall motion abnormality  was identified, this possibility cannot be completely excluded on the basis of  this study  Wall thickness was mildly increased  LEFT ATRIUM:  The atrium was mildly dilated  AORTIC VALVE:  There was mild regurgitation  TRICUSPID VALVE:  There was mild regurgitation  PULMONIC VALVE:  There was mild regurgitation  AORTA:  The root exhibited mild dilation for BSA at 4 4cm  HISTORY: PRIOR HISTORY: Syncope, hypotension  PROCEDURE: The procedure was performed at the bedside  This was a routine  study  The transthoracic approach was used  The study included complete 2D  imaging, M-mode, complete spectral Doppler, and color Doppler  The heart rate  was 59 bpm, at the start of the study  Images were obtained from the  parasternal, apical, subcostal, and suprasternal notch acoustic windows  Intravenous contrast was administered to opacify the left ventricle  Echocardiographic views were limited due to decreased penetration  This was a  technically difficult study  LEFT VENTRICLE: Size was normal  Systolic function was normal by visual  assessment  Ejection fraction was estimated to be 55 %  Although no diagnostic  regional wall motion abnormality was identified, this possibility cannot be  completely excluded on the basis of this study  Wall thickness was mildly  increased   DOPPLER: Left ventricular diastolic function parameters were normal     RIGHT VENTRICLE: The size was normal  Systolic function was normal  Wall  thickness was normal  A pacing wire was present  LEFT ATRIUM: The atrium was mildly dilated  RIGHT ATRIUM: Size was normal     MITRAL VALVE: Valve structure was normal  There was normal leaflet separation  DOPPLER: The transmitral velocity was within the normal range  There was no  evidence for stenosis  There was no regurgitation  AORTIC VALVE: The valve was trileaflet  Leaflets exhibited normal thickness and  normal cuspal separation  DOPPLER: Transaortic velocity was within the normal  range  There was no evidence for stenosis  There was mild regurgitation  TRICUSPID VALVE: The valve structure was normal  There was normal leaflet  separation  DOPPLER: The transtricuspid velocity was within the normal range  There was no evidence for stenosis  There was mild regurgitation  Pulmonary  artery systolic pressure was within the normal range  Estimated peak PA  pressure was 28 mmHg  PULMONIC VALVE: Leaflets exhibited normal thickness, no calcification, and  normal cuspal separation  DOPPLER: The transpulmonic velocity was within the  normal range  There was mild regurgitation  PERICARDIUM: There was no pericardial effusion  The pericardium was normal in  appearance  AORTA: The root exhibited mild dilation for BSA at 4 4cm      SYSTEM MEASUREMENT TABLES    2D  %FS: 46 22 %  Ao Diam: 4 44 cm  EDV(Teich): 158 86 ml  EF Biplane: 57 54 %  EF(Teich): 76 96 %  ESV(Teich): 36 61 ml  IVSd: 1 06 cm  LA Diam: 4 5 cm  LVEDV MOD A2C: 59 48 ml  LVEDV MOD A4C: 64 67 ml  LVEDV MOD BP: 61 73 ml  LVEF MOD A2C: 65 08 %  LVEF MOD A4C: 52 26 %  LVESV MOD A2C: 20 77 ml  LVESV MOD A4C: 30 87 ml  LVESV MOD BP: 26 21 ml  LVIDd: 5 68 cm  LVIDs: 3 06 cm  LVLd A2C: 7 79 cm  LVLd A4C: 7 79 cm  LVLs A2C: 6 24 cm  LVLs A4C: 6 73 cm  LVOT Diam: 2 39 cm  LVPWd: 1 3 cm  SV MOD A2C: 38 71 ml  SV MOD A4C: 33 8 ml  SV(Teich): 122 25 ml    CW  AV Env  Ti: 245 67 ms  AV VTI: 22 49 cm  AV Vmax: 1 24 m/s  AV Vmean: 0 92 m/s  AV maxP 11 mmHg  AV meanPG: 3 76 mmHg  TR Vmax: 2 44 m/s  TR maxP 88 mmHg    MM  TAPSE: 1 87 cm    PW  TAYLER (VTI): 2 65 cm2  TAYLER Vmax: 2 78 cm2  LVOT Env  Ti: 217 99 ms  LVOT VTI: 13 26 cm  LVOT Vmax: 0 76 m/s  LVOT Vmean: 0 61 m/s  LVOT maxP 33 mmHg  LVOT meanP 58 mmHg  LVSI Dopp: 26 64 ml/m2  LVSV Dopp: 59 68 ml  MV A James: 0 01 m/s  MV Dec [place]: 2 18 m/s2  MV DecT: 246 45 ms  MV E James: 0 54 m/s  MV E/A Ratio: 91 76  MV PHT: 71 47 ms  MVA By PHT: 3 08 cm2    [employer] Accredited Echocardiography [facility]    Prepared and electronically signed by    Frank Ojeda MD  Signed [id number] 16:51:47             Madhu Malloy MD    Portions of the record may have been created with voice recognition software   Occasional wrong word or "sound a like" substitutions may have occurred due to the inherent limitations of voice recognition software   Read the chart carefully and recognize, using context, where substitutions have occurred

## 2017-10-03 ENCOUNTER — GENERIC CONVERSION - ENCOUNTER (OUTPATIENT)
Dept: OTHER | Facility: OTHER | Age: 82
End: 2017-10-03

## 2017-10-17 ENCOUNTER — ALLSCRIPTS OFFICE VISIT (OUTPATIENT)
Dept: OTHER | Facility: OTHER | Age: 82
End: 2017-10-17

## 2018-01-11 NOTE — MISCELLANEOUS
Message   Recorded as Task   Date: 02/16/2017 10:17 AM, Created By: Jaron Smart   Task Name: Follow Up   Assigned To: Daniel Sherman   Regarding Patient: Jaison Pierre, Status: In Progress   Comment:    Doug Brown - 16 Feb 2017 10:17 AM     TASK CREATED  Can you fax over my note to Dr Carolann Curling his primary care doctor at [facility] [place]  Thank you  Rosaline Negron - 16 Feb 2017 10:40 AM     TASK IN PROGRESS   Today's office note has been faxed over to Dr Carolann Curling at [facility] 2/16/2017      Active Problems    1  Acute myocardial infarction (410 90) (I21 3)   2  Acute on chronic systolic CHF (congestive heart failure) (428 23,428 0) (I50 23)   3  Afib (427 31) (I48 91)   4  Anxiety (300 00) (F41 9)   5  Chronic systolic CHF (congestive heart failure) (428 22,428 0) (I50 22)   6  CKD (chronic kidney disease), stage IV (585 4) (N18 4)   7  Congestive heart failure (428 0) (I50 9)   8  Coronary artery disease (414 00) (I25 10)   9  Diuretic-induced hypokalemia (276 8,E944 4) (E87 6,T50 2X5A)   10  Dysphagia (787 20) (R13 10)   11  GERD (gastroesophageal reflux disease) (530 81) (K21 9)   12  Hyperlipidemia (272 4) (E78 5)   13  Hypervolemia, unspecified hypervolemia type (276 69) (E87 70)   14  Hypothyroidism (244 9) (E03 9)   15  Ischemic cardiomyopathy (414 8) (I25 5)   16  Muscle weakness (728 87) (M62 81)   17  Osteoarthritis (715 90) (M19 90)   18  Sick sinus syndrome (427 81) (I49 5)    Current Meds   1  Albuterol Sulfate (2 5 MG/3ML) 0 083% Inhalation Nebulization Solution; Therapy: (Recorded:[id number]) to Recorded   2  ALPRAZolam 0 5 MG Oral Tablet; Therapy: (Recorded:18Pjo9213) to Recorded   3  Amiodarone HCl - 200 MG Oral Tablet; Take 1 tablet twice daily; Therapy: (Recorded:54Qbc9920) to Recorded   4  Aspirin 81 MG CAPS; take 1 capsule daily; Therapy: (Recorded:26Puo5956) to Recorded   5  Atorvastatin Calcium 40 MG Oral Tablet; Take 1 tablet daily;    Therapy: (Recorded:16Ftl9285) to Recorded   6  Carvedilol 3 125 MG Oral Tablet; Take 1 tablet twice daily; Therapy: (Recorded:28Jul2016) to Recorded   7  Colace CAPS; Therapy: (Recorded:04Oct2016) to Recorded   8  Fleet Enema ENEM; Therapy: (Recorded:04Oct2016) to Recorded   9  Levothyroxine Sodium 50 MCG Oral Tablet; Take 1 tablet daily; Therapy: (Recorded:28Jul2016) to Recorded   10  Melatonin 10 MG Oral Capsule; TAKE 2 CAPSULE Daily; Therapy: (Recorded:28Jul2016) to Recorded   11  MetOLazone 2 5 MG Oral Tablet; take 1 tablet as directed; Last Rx:10Oct2016 Ordered   12  MiraLax Oral Packet; Therapy: (Recorded:04Oct2016) to Recorded   13  Nitrostat 0 4 MG Sublingual Tablet Sublingual;    Therapy: (Recorded:28Jul2016) to Recorded   14  Omeprazole 20 MG Oral Tablet Delayed Release; Take 1 tablet daily; Therapy: (Recorded:28Jul2016) to Recorded   15  Potassium Chloride [place] ER 20 MEQ Oral Tablet Extended Release; Take 1 tablet daily; Therapy: (Recorded:28Jul2016) to Recorded   16  Ranexa 500 MG Oral Tablet Extended Release 12 Hour; Take 1 tablet twice daily; Therapy: (Recorded:28Jul2016) to Recorded   17  Sorbitol 70 % Rectal Solution; Therapy: (Recorded:28Jul2016) to Recorded   18  Torsemide 10 MG Oral Tablet; TAKE 1 TABLET DAILY; Therapy: (Recorded:46Hbw3652) to Recorded    Allergies    1   Plavix    Signatures   Electronically signed by : Anjum Vargas DO; Feb 16 2017 11:56AM EST                       (Author)

## 2018-01-14 VITALS — DIASTOLIC BLOOD PRESSURE: 70 MMHG | SYSTOLIC BLOOD PRESSURE: 100 MMHG | HEART RATE: 58 BPM | HEIGHT: 72 IN

## 2018-01-14 VITALS — DIASTOLIC BLOOD PRESSURE: 64 MMHG | SYSTOLIC BLOOD PRESSURE: 118 MMHG | HEART RATE: 95 BPM | HEIGHT: 72 IN

## 2018-01-14 VITALS
HEART RATE: 57 BPM | TEMPERATURE: 95.8 F | OXYGEN SATURATION: 98 % | SYSTOLIC BLOOD PRESSURE: 116 MMHG | DIASTOLIC BLOOD PRESSURE: 58 MMHG

## 2018-01-14 VITALS — DIASTOLIC BLOOD PRESSURE: 58 MMHG | HEIGHT: 72 IN | SYSTOLIC BLOOD PRESSURE: 98 MMHG | HEART RATE: 68 BPM

## 2018-01-15 VITALS — DIASTOLIC BLOOD PRESSURE: 60 MMHG | SYSTOLIC BLOOD PRESSURE: 110 MMHG | HEART RATE: 65 BPM

## 2018-01-15 VITALS
OXYGEN SATURATION: 90 % | SYSTOLIC BLOOD PRESSURE: 124 MMHG | DIASTOLIC BLOOD PRESSURE: 80 MMHG | TEMPERATURE: 96.8 F | HEART RATE: 62 BPM

## 2018-01-15 VITALS — HEIGHT: 72 IN | DIASTOLIC BLOOD PRESSURE: 68 MMHG | HEART RATE: 54 BPM | SYSTOLIC BLOOD PRESSURE: 120 MMHG

## 2018-01-15 NOTE — MISCELLANEOUS
History of Present Illness    The patient is being contacted for follow-up after hospitalization  Hospitalization The hospitalization was not a readmission, The patient was discharged on 9/30/17  He has a moderate risk for readmission  He was discharged to a SNF for long-term care  The patient's status is permanent resident  The facility name is: [facility]  Phone number: [phone]  I spoke with Kirsten Stewatr  The patient is on HF pathway  Treatment Questions:   HFCC Additional Notes:   Staff very knowledgeable re: HF monitoring  Current Meds   1  Acetaminophen 325 MG CAPS; Therapy: (Recorded:04May2017) to Recorded   2  Albuterol Sulfate (2 5 MG/3ML) 0 083% Inhalation Nebulization Solution; USE 1 UNIT   DOSE EVERY 4-6 HOURS AS NEEDED FOR WHEEZING ; Therapy: [id number] to Recorded   3  ALPRAZolam 0 25 MG Oral Tablet; Therapy: (Lupe [place]) to Recorded   4  Amiodarone HCl - 200 MG Oral Tablet; Take 1 tablet twice daily; Therapy: (Recorded:28Jul2016) to Recorded   5  Aspirin 81 MG CAPS; take 1 capsule daily; Therapy: (Recorded:28Jul2016) to Recorded   6  Carvedilol 3 125 MG Oral Tablet; Take 1 tablet twice daily; Therapy: (Recorded:28Jul2016) to Recorded   7  Colace CAPS; Therapy: (Recorded:04Oct2016) to Recorded   8  [employer] Melatonin 5 MG Oral Capsule; Therapy: (Lupe [place]) to Recorded   9  Fleet Enema ENEM; Therapy: (Recorded:04Oct2016) to Recorded   10  Levothyroxine Sodium 50 MCG Oral Tablet; Take 1 tablet daily; Therapy: (Recorded:28Jul2016) to Recorded   11  MiraLax Oral Packet (Polyethylene Glycol 3350); Therapy: (Recorded:04Oct2016) to Recorded   12  Nitrostat 0 4 MG Sublingual Tablet Sublingual (Nitroglycerin); Therapy: (Recorded:28Jul2016) to Recorded   13  Potassium Chloride [place] ER 20 MEQ Oral Tablet Extended Release; Take 1 tablet 4    times daily; Therapy: (Recorded:04May2017) to Recorded   14  Pravastatin Sodium 40 MG Oral Tablet;  Take 1 tablet daily; Therapy: (Recorded:49Iju0365) to Recorded   15  Ranexa 500 MG Oral Tablet Extended Release 12 Hour; Take 1 tablet twice daily; Therapy: (Recorded:81Tbm9269) to Recorded   16  Sorbitol 70 % Rectal Solution; Therapy: (Recorded:67Ehk2773) to Recorded   17  Torsemide 10 MG Oral Tablet; TAKE 1 TABLET DAILY; Therapy: (Recorded:86Nhh4373) to Recorded   18  Torsemide 20 MG Oral Tablet; take 1 tablet by mouth every day; Therapy: [id number] to (Evaluate:24Twn6060) Recorded    Allergies    1   Plavix    Future Appointments    Date/Time Provider Specialty Site   10/17/2017 09:30 AM Cardiology, [facility]     Signatures   Electronically signed by : Lesley Gould, ; Oct  3 2017  2:04PM EST                       (Author)

## 2018-01-17 NOTE — MISCELLANEOUS
Message   Recorded as Task   Date: 06/20/2017 09:07 AM, Created By: Trista Bae   Task Name: Follow Up   Assigned To: Payal Pires   Regarding Patient: Monica Rios, Status: In Progress   Comment:    Doug Brown - 20 Jun 2017 9:07 AM     TASK CREATED  Please let him know that I reviewed his blood work  His creatinine is stable and if he feels ok will not change any of his medications  Thanks   Tammi Mancera - 20 Jun 2017 9:37 AM     TASK IN PROGRESS   Tammi Mancera - 20 Jun 2017 10:00 AM     TASK EDITED  Left message for a call back  I called and spoke with Jono Calero (RN at [facility]) regarding Phan's blood work results  Per Dr Fuad Sorensen, there are no changes to his medications based on lab results  Active Problems    1  Abnormal liver enzymes (790 5) (R74 8)   2  Acute myocardial infarction (410 90) (I21 3)   3  Acute on chronic systolic CHF (congestive heart failure) (428 23,428 0) (I50 23)   4  Afib (427 31) (I48 91)   5  Anxiety (300 00) (F41 9)   6  Chronic systolic CHF (congestive heart failure) (428 22,428 0) (I50 22)   7  CKD (chronic kidney disease), stage IV (585 4) (N18 4)   8  Congestive heart failure (428 0) (I50 9)   9  Coronary artery disease (414 00) (I25 10)   10  Diuretic-induced hypokalemia (276 8,E944 4) (E87 6,T50 2X5A)   11  Dysphagia (787 20) (R13 10)   12  GERD (gastroesophageal reflux disease) (530 81) (K21 9)   13  Hepatitis, acute (570) (B17 9)   14  Hyperlipidemia (272 4) (E78 5)   15  Hypervolemia, unspecified hypervolemia type (276 69) (E87 70)   16  Hypothyroidism (244 9) (E03 9)   17  Ischemic cardiomyopathy (414 8) (I25 5)   18  Muscle weakness (728 87) (M62 81)   19  Osteoarthritis (715 90) (M19 90)   20  Sick sinus syndrome (427 81) (I49 5)    Current Meds   1  Acetaminophen 325 MG CAPS; Therapy: (Recorded:54Tyr6536) to Recorded   2  ALPRAZolam 0 5 MG Oral Tablet; Therapy: (Recorded:21Qru8394) to Recorded   3  Amiodarone HCl - 200 MG Oral Tablet;  Take 1 tablet twice daily; Therapy: (Recorded:64Zhi8089) to Recorded   4  Aspirin 81 MG CAPS; take 1 capsule daily; Therapy: (Recorded:45Ygm4735) to Recorded   5  Carvedilol 3 125 MG Oral Tablet; Take 1 tablet twice daily; Therapy: (Recorded:28Jul2016) to Recorded   6  Colace CAPS; Therapy: (Recorded:04Oct2016) to Recorded   7  Fleet Enema ENEM; Therapy: (Recorded:04Oct2016) to Recorded   8  Levothyroxine Sodium 50 MCG Oral Tablet; Take 1 tablet daily; Therapy: (Recorded:28Jul2016) to Recorded   9  Melatonin 10 MG Oral Capsule; TAKE 2 CAPSULE Daily; Therapy: (Recorded:28Jul2016) to Recorded   10  MetOLazone 2 5 MG Oral Tablet; take 1 tablet as directed; Last Rx:10Oct2016 Ordered   11  MiraLax Oral Packet; Therapy: (Recorded:04Oct2016) to Recorded   12  Nitrostat 0 4 MG Sublingual Tablet Sublingual;    Therapy: (Recorded:06Qpw8996) to Recorded   13  Omeprazole 20 MG Oral Tablet Delayed Release; Take 1 tablet daily; Therapy: (Recorded:92Mpj7287) to Recorded   14  Potassium Chloride [place] ER 20 MEQ Oral Tablet Extended Release; Take 1 tablet 4    times daily; Therapy: (Recorded:61Adu1480) to Recorded   15  Pravastatin Sodium 40 MG Oral Tablet; Take 1 tablet daily; Therapy: (Recorded:73Sgq1055) to Recorded   16  Ranexa 500 MG Oral Tablet Extended Release 12 Hour; Take 1 tablet twice daily; Therapy: (Recorded:94Kwz9065) to Recorded   17  Sorbitol 70 % Rectal Solution; Therapy: (Recorded:55Gok8234) to Recorded   18  Torsemide 10 MG Oral Tablet; TAKE 1 TABLET DAILY; Therapy: (Recorded:93Bqc6865) to Recorded    Allergies    1   Plavix    Signatures   Electronically signed by : Halie Dowling DO; Jun 21 2017 12:58PM EST                       (Author)

## 2018-01-30 ENCOUNTER — OFFICE VISIT (OUTPATIENT)
Dept: CARDIOLOGY CLINIC | Facility: CLINIC | Age: 83
End: 2018-01-30
Payer: MEDICARE

## 2018-01-30 VITALS — HEART RATE: 55 BPM | SYSTOLIC BLOOD PRESSURE: 80 MMHG | DIASTOLIC BLOOD PRESSURE: 60 MMHG | HEIGHT: 72 IN

## 2018-01-30 DIAGNOSIS — I25.10 CORONARY ARTERY DISEASE INVOLVING NATIVE CORONARY ARTERY OF NATIVE HEART WITHOUT ANGINA PECTORIS: ICD-10-CM

## 2018-01-30 DIAGNOSIS — E78.2 MIXED HYPERLIPIDEMIA: ICD-10-CM

## 2018-01-30 DIAGNOSIS — I25.5 ISCHEMIC CARDIOMYOPATHY: ICD-10-CM

## 2018-01-30 DIAGNOSIS — I48.91 ATRIAL FIBRILLATION, UNSPECIFIED TYPE (HCC): Primary | ICD-10-CM

## 2018-01-30 DIAGNOSIS — I50.22 CHRONIC SYSTOLIC CHF (CONGESTIVE HEART FAILURE) (HCC): ICD-10-CM

## 2018-01-30 DIAGNOSIS — I48.0 PAROXYSMAL ATRIAL FIBRILLATION (HCC): Chronic | ICD-10-CM

## 2018-01-30 PROCEDURE — 99215 OFFICE O/P EST HI 40 MIN: CPT | Performed by: INTERNAL MEDICINE

## 2018-01-30 PROCEDURE — 93000 ELECTROCARDIOGRAM COMPLETE: CPT | Performed by: INTERNAL MEDICINE

## 2018-01-30 NOTE — PROGRESS NOTES
Cardiology Follow Up    Msdunia Johnson Hia[place]atha  3/6/1930  [id number]  [address] [facility]  [address]    1  Atrial fibrillation, unspecified type ([address] )  POCT ECG   2  Paroxysmal atrial fibrillation (HCC)     3  Coronary artery disease involving native coronary artery of native heart without angina pectoris     4  Ischemic cardiomyopathy status post ICD     5  Mixed hyperlipidemia     6  Chronic systolic CHF (congestive heart failure) (HCC)         Interval History: Patient had recent hospitalization in Sept 2017 due to acute on chronic systolic CHF with volume excess in legs and wheezing on exam per Dr Carter Livingston  He also had a generator change for his PPM while he was in the hospital   Currently, has been maintaining stable LE edema with no significant worsening since hospitalization  No excessive shortness of breath and no noted orthopnea or PND      Patient Active Problem List   Diagnosis    Cognitive impairment    [place] (hard of hearing)    Ambulatory dysfunction    Hypotension    Hypothyroidism    Paroxysmal atrial fibrillation (HCC)    Acute on chronic combined systolic and diastolic (congestive) heart failure    CKD (chronic kidney disease), stage III    CAD (coronary artery disease)     Ischemic cardiomyopathy status post ICD    Paroxysmal atrial fibrillation ([address] )    Hyperlipidemia    Decubitus ulcer of sacral region, stage 1    Hypothyroidism    Elevated LFTs    Chronic systolic CHF (congestive heart failure) ([address] )     Past Medical History:   Diagnosis Date    Acquired hypothyroidism 12/28/2016    Anxiety     Atrial fibrillation ([address] )     Cardiac disease     CHF (congestive heart failure) ([address] )     Disease of thyroid gland     Gout     Hyperlipidemia     Paroxysmal atrial fibrillation ([address] ) 12/28/2016     Social History     Social History    Marital status: Single Spouse name: N/A    Number of children: N/A    Years of education: N/A     Occupational History    Not on file  Social History Main Topics    Smoking status: Never Smoker    Smokeless tobacco: Never Used    Alcohol use 0 6 oz/week     1 Glasses of wine per week    Drug use: No    Sexual activity: No     Other Topics Concern    Not on file     Social History Narrative    No narrative on file      Family History   Problem Relation Age of Onset    No Known Problems Mother      Past Surgical History:   Procedure Laterality Date    CORONARY ANGIOPLASTY      INSERT / REPLACE / REMOVE PACEMAKER         Current Outpatient Prescriptions:     acetaminophen (TYLENOL) 325 mg tablet, Take 325 mg by mouth every 4 (four) hours as needed for mild pain , Disp: , Rfl:     albuterol (2 5 mg/3 mL) 0 083 % nebulizer solution, Take 2 5 mg by nebulization every 4 (four) hours as needed for wheezing, Disp: , Rfl:     allopurinol (ZYLOPRIM) 100 mg tablet, Take 100 mg by mouth daily  , Disp: , Rfl:     ALPRAZolam (XANAX) 0 5 mg tablet, Take 1 tablet by mouth daily at bedtime for 10 days, Disp: 10 tablet, Rfl: 0    aluminum-magnesium hydroxide-simethicone (MYLANTA) 200-200-20 mg/5 mL suspension, Take 15 mL by mouth daily as needed for indigestion or heartburn , Disp: , Rfl:     amiodarone 200 mg tablet, Take 200 mg by mouth 2 (two) times a day , Disp: , Rfl:     aspirin 81 MG tablet, Take 81 mg by mouth daily  , Disp: , Rfl:     atorvastatin (LIPITOR) 40 mg tablet, Take 40 mg by mouth daily  , Disp: , Rfl:     carvedilol (COREG) 3 125 mg tablet, Take 3 125 mg by mouth 2 (two) times a day with meals  , Disp: , Rfl:     Dextromethorphan-Guaifenesin (ROBITUSSIN DM PO), Take 10 mL by mouth every 4 (four) hours as needed  , Disp: , Rfl:     levothyroxine 50 mcg tablet, Take 50 mcg by mouth daily  , Disp: , Rfl:     Melatonin 10 MG CAPS, Take 10 mg by mouth daily at bedtime  , Disp: , Rfl:     Multiple Vitamins-Minerals (THERA-M PO), Take 1 tablet by mouth daily  , Disp: , Rfl:     nitroglycerin (NITROSTAT) 0 4 mg SL tablet, Place 0 4 mg under the tongue every 5 (five) minutes as needed for chest pain , Disp: , Rfl:     omeprazole (PriLOSEC) 20 mg delayed release capsule, Take 20 mg by mouth daily  , Disp: , Rfl:     potassium chloride 20 MEQ TBCR, Take 2 tablets by mouth daily for 30 days, Disp: 60 tablet, Rfl: 0    ramipril (ALTACE) 5 mg capsule, Take 1 capsule by mouth daily for 30 days, Disp: 30 capsule, Rfl: 0    ranolazine (RANEXA) 500 mg 12 hr tablet, Take 500 mg by mouth 2 (two) times a day , Disp: , Rfl:     torsemide (DEMADEX) 20 mg tablet, Take 1 tablet by mouth daily, Disp: , Rfl: 0  Allergies   Allergen Reactions    Plavix [Clopidogrel Bisulfate]        Labs:  Admission on 09/25/2017, Discharged on 09/30/2017   Component Date Value    Sodium 09/25/2017 139     Potassium 09/25/2017 4 6     Chloride 09/25/2017 108     CO2 09/25/2017 22     Anion Gap 09/25/2017 9     BUN 09/25/2017 30*    Creatinine 09/25/2017 1 51*    Glucose 09/25/2017 94     Calcium 09/25/2017 9 4     AST 09/25/2017 79*    ALT 09/25/2017 73     Alkaline Phosphatase 09/25/2017 126*    Total Protein 09/25/2017 7 4     Albumin 09/25/2017 2 9*    Total Bilirubin 09/25/2017 0 64     eGFR 09/25/2017 41     WBC 09/25/2017 5 45     RBC 09/25/2017 3 75*    Hemoglobin 09/25/2017 12 2     Hematocrit 09/25/2017 37 2     MCV 09/25/2017 99*    MCH 09/25/2017 32 5     MCHC 09/25/2017 32 8     RDW 09/25/2017 14 5     MPV 09/25/2017 10 7     Platelets 38/79/1848 253     nRBC 09/25/2017 0     Neutrophils Relative 09/25/2017 54     Lymphocytes Relative 09/25/2017 31     Monocytes Relative 09/25/2017 11     Eosinophils Relative 09/25/2017 3     Basophils Relative 09/25/2017 1     Neutrophils Absolute 09/25/2017 2 99     Lymphocytes Absolute 09/25/2017 1 68     Monocytes Absolute 09/25/2017 0 58     Eosinophils Absolute 09/25/2017 0 16     Basophils Absolute 09/25/2017 0 03     Ventricular Rate 09/25/2017 56     Atrial Rate 09/25/2017 56     AK Interval 09/25/2017 110     QRSD Interval 09/25/2017 52     QT Interval 09/25/2017 470     QTC Interval 09/25/2017 453     P Axis 09/25/2017 -28     QRS [place] 09/25/2017 195     T Wave Axis 09/25/2017 -15     NT-proBNP 09/25/2017 1661*    Troponin I 09/25/2017 0 02     Ventricular Rate 09/26/2017 103     Atrial Rate 09/26/2017 31     AK Interval 09/26/2017 106     QRSD Interval 09/26/2017 90     QT Interval 09/26/2017 520     QTC Interval 09/26/2017 681     QRS Axis 09/26/2017 109     T Wave Axis 09/26/2017 -29     Sodium 09/26/2017 139     Potassium 09/26/2017 4 1     Chloride 09/26/2017 105     CO2 09/26/2017 26     Anion Gap 09/26/2017 8     BUN 09/26/2017 28*    Creatinine 09/26/2017 1 44*    Glucose 09/26/2017 91     Glucose, Fasting 09/26/2017 91     Calcium 09/26/2017 9 2     eGFR 09/26/2017 43     Magnesium 09/26/2017 2 3     Phosphorus 09/26/2017 2 7     WBC 09/26/2017 5 71     RBC 09/26/2017 3 68*    Hemoglobin 09/26/2017 11 7*    Hematocrit 09/26/2017 36 2*    MCV 09/26/2017 98     MCH 09/26/2017 31 8     MCHC 09/26/2017 32 3     RDW 09/26/2017 14 2     Platelets 45/15/5448 238     MPV 09/26/2017 10 9     TSH 3RD GENERATON 09/26/2017 2 340     Sodium 09/27/2017 137     Potassium 09/27/2017 3 8     Chloride 09/27/2017 105     CO2 09/27/2017 23     Anion Gap 09/27/2017 9     BUN 09/27/2017 31*    Creatinine 09/27/2017 1 58*    Glucose 09/27/2017 90     Calcium 09/27/2017 9 2     eGFR 09/27/2017 39     Sodium 09/28/2017 138     Potassium 09/28/2017 3 6     Chloride 09/28/2017 107     CO2 09/28/2017 25     Anion Gap 09/28/2017 6     BUN 09/28/2017 29*    Creatinine 09/28/2017 1 41*    Glucose 09/28/2017 93     Calcium 09/28/2017 9 2     eGFR 09/28/2017 44     Sodium 09/29/2017 138     Potassium 09/29/2017 3 9     Chloride 09/29/2017 105     CO2 09/29/2017 25     Anion Gap 09/29/2017 8     BUN 09/29/2017 28*    Creatinine 09/29/2017 1 65*    Glucose 09/29/2017 97     Calcium 09/29/2017 9 2     AST 09/29/2017 109*    ALT 09/29/2017 88*    Alkaline Phosphatase 09/29/2017 169*    Total Protein 09/29/2017 7 0     Albumin 09/29/2017 2 7*    Total Bilirubin 09/29/2017 0 49     eGFR 09/29/2017 37     WBC 09/29/2017 5 40     RBC 09/29/2017 3 78*    Hemoglobin 09/29/2017 12 3     Hematocrit 09/29/2017 36 9     MCV 09/29/2017 98     MCH 09/29/2017 32 5     MCHC 09/29/2017 33 3     RDW 09/29/2017 14 2     MPV 09/29/2017 10 9     Platelets 07/94/4787 261     nRBC 09/29/2017 0     Neutrophils Relative 09/29/2017 52     Lymphocytes Relative 09/29/2017 30     Monocytes Relative 09/29/2017 13*    Eosinophils Relative 09/29/2017 4     Basophils Relative 09/29/2017 1     Neutrophils Absolute 09/29/2017 2 80     Lymphocytes Absolute 09/29/2017 1 60     Monocytes Absolute 09/29/2017 0 70     Eosinophils Absolute 09/29/2017 0 22     Basophils Absolute 09/29/2017 0 05     WBC 09/30/2017 5 05     RBC 09/30/2017 3 65*    Hemoglobin 09/30/2017 11 9*    Hematocrit 09/30/2017 35 0*    MCV 09/30/2017 96     MCH 09/30/2017 32 6     MCHC 09/30/2017 34 0     RDW 09/30/2017 13 9     Platelets 62/02/3578 257     MPV 09/30/2017 10 8     Sodium 09/30/2017 136     Potassium 09/30/2017 3 7     Chloride 09/30/2017 104     CO2 09/30/2017 23     Anion Gap 09/30/2017 9     BUN 09/30/2017 33*    Creatinine 09/30/2017 1 71*    Glucose 09/30/2017 108     Calcium 09/30/2017 9 2     AST 09/30/2017 92*    ALT 09/30/2017 81*    Alkaline Phosphatase 09/30/2017 125*    Total Protein 09/30/2017 6 9     Albumin 09/30/2017 2 6*    Total Bilirubin 09/30/2017 0 53     eGFR 09/30/2017 35     Protime 09/30/2017 16 2*    INR 09/30/2017 1 29*    POC Glucose 09/30/2017 101      No results found for: CHOL, TRIG, HDL, LDLDIRECT  Imaging: No results found  EKG: Electronic ventricular pacemaker    Review of Systems:  Review of Systems   Constitutional: Positive for fatigue  Negative for activity change, appetite change and fever  HENT: Negative for nosebleeds and sore throat  Eyes: Negative for photophobia and visual disturbance  Respiratory: Positive for shortness of breath  Negative for cough, chest tightness and wheezing  Cardiovascular: Positive for leg swelling  Negative for chest pain and palpitations  Gastrointestinal: Negative for abdominal pain, diarrhea, nausea and vomiting  Endocrine: Negative for polyuria  Genitourinary: Positive for frequency  Negative for dysuria and hematuria  Musculoskeletal: Positive for gait problem  Negative for arthralgias and back pain  Skin: Negative for pallor and rash  Neurological: Negative for dizziness, syncope, speech difficulty and light-headedness  Hematological: Does not bruise/bleed easily  Psychiatric/Behavioral: Negative for agitation, behavioral problems and confusion  Physical Exam:  Physical Exam   Constitutional: He is oriented to person, place, and time  He appears well-developed and well-nourished  No distress  HENT:   Head: Normocephalic and atraumatic  Nose: Nose normal    Eyes: EOM are normal  Pupils are equal, round, and reactive to light  No scleral icterus  Neck: Normal range of motion  Neck supple  No JVD present  Cardiovascular: Normal rate, regular rhythm, S1 normal and S2 normal   Exam reveals no gallop, no S3, no distant heart sounds and no friction rub  Murmur heard  Systolic murmur is present with a grade of 2/6   Pulmonary/Chest: Effort normal and breath sounds normal  No respiratory distress  He has no wheezes  He has no rales  Abdominal: Soft  Bowel sounds are normal  He exhibits no distension  Musculoskeletal: He exhibits edema  He exhibits no deformity     Neurological: He is alert and oriented to person, place, and time  No cranial nerve deficit  Skin: Skin is warm and dry  He is not diaphoretic  No erythema  Psychiatric: He has a normal mood and affect  His behavior is normal    Vitals reviewed  Discussion/Summary:  1) CAD: with h/o MI and ischemic cardiomyopathy - EF 40% on most recent echo in 2015  He is now compensated as far as his volume status, with stable LE edema  Asymptomatic with no chest pain  Continue medical management  Will continue on torsemide and metolazone  Would also have him wear compression stockings as well  2) HTN: well controlled, continue current regimen  3) HLD: maintained on statin  4) Chronic Afib: with SSS s/p PPM, with recent generator change in Sept 2017  Continued on amiodarone, but no anticoagulation on board  His CHADS-Vasc2 score is elevated at 4  Considering fall risk, and falls with rib fractures, will hold off on anticoagulation

## 2018-01-30 NOTE — PATIENT INSTRUCTIONS
Heart Failure   AMBULATORY CARE:   Heart failure (HF) is a condition that does not allow your heart to fill or pump properly  Not enough oxygen in your blood gets to your organs and tissues  HF can occur in the right side, the left side, or both lower chambers of your heart  HF is often caused by damage or injury to your heart  The damage may be caused by heart attack, other heart conditions, or high blood pressure  HF is a long-term condition that tends to get worse over time  It is important to manage your health to improve your quality of life  HF can be worsened by heavy alcohol use, smoking, diabetes that is not controlled, or obesity  Common signs and symptoms:   · Difficulty breathing with activity that worsens to difficulty breathing at rest    · Shortness of breath while lying flat    · Severe shortness of breath and coughing at night that usually wakes you     · Chest pain at night    · Periods of no breathing, then breathing fast    · Fatigue or lack of energy (often worsened by physical activity)     · Swelling in your ankles, legs, or abdomen    · Fast heartbeat, purple color around your mouth and nailbeds    · Fingers and toes cool to the touch  Call 911 if:   · You have any of the following signs of a heart attack:      ¨ Squeezing, pressure, or pain in your chest that lasts longer than 5 minutes or returns    ¨ Discomfort or pain in your back, neck, jaw, stomach, or arm     ¨ Trouble breathing    ¨ Nausea or vomiting    ¨ Lightheadedness or a sudden cold sweat, especially with chest pain or trouble breathing    Seek care immediately if:   · You gain 3 or more pounds (1 4 kg) in a day, or more than your healthcare provider says you should  · Your heartbeat is fast, slow, or uneven all the time    Contact your healthcare provider if:   · You have symptoms of worsening HF:      ¨ Shortness of breath at rest, at night, or that is getting worse in any way     ¨ Weight gain of 5 or more pounds (2 2 kg) in a week     ¨ More swelling in your legs or ankles     ¨ Abdominal pain or swelling     ¨ More coughing     ¨ Loss of appetite     ¨ Feeling tired all the time    · You feel hopeless or depressed, or you have lost interest in things you used to enjoy  · You often feel worried or afraid  · You have questions or concerns about your condition or care  Treatment for HF  may include any of the following:  · Medicines  may be needed to help regulate your heart rhythm  You may also need medicines to lower your blood pressure, and to get rid of extra fluids  · Oxygen  may help you breathe easier if your oxygen level is lower than normal  A CPAP machine may be used to keep your airway open while you sleep  · Surgery  can be done to implant a pacemaker in your chest to regulate your heart rhythm  Other types of surgery can open blocked heart vessels, replace a damaged heart valve, or remove scar tissue  Manage or prevent HF:   · Do not smoke  Nicotine and other chemicals in cigarettes and cigars can cause lung damage and make HF difficult to manage  Ask your healthcare provider for information if you currently smoke and need help to quit  E-cigarettes or smokeless tobacco still contain nicotine  Talk to your healthcare provider before you use these products  · Do not drink alcohol or take illegal drugs  Alcohol and drugs can worsen your symptoms quickly  · Weigh yourself every morning  Use the same scale, in the same spot  Do this after you use the bathroom, but before you eat or drink anything  Wear the same type of clothing  Do not wear shoes  Record your weight each day so you will notice any sudden weight gain  Swelling and weight gain are signs of fluid retention  If you are overweight, ask how to lose weight safely  · Check your blood pressure and heart rate every day  Ask for more information about how to measure your blood pressure and heart rate correctly   Ask what these numbers should be for you  · Manage any chronic health conditions you have  These include high blood pressure, diabetes, obesity, high cholesterol, metabolic syndrome, and COPD  You will have fewer symptoms if you manage these health conditions  Follow your healthcare provider's recommendations and follow up with him or her regularly  · Eat heart-healthy foods and limit sodium (salt)  An easy way to do this is to eat more fresh fruits and vegetables and fewer canned and processed foods  Replace butter and margarine with heart-healthy oils such as olive oil and canola oil  Other heart-healthy foods include walnuts, whole-grain breads, low-fat dairy products, beans, and lean meats  Fatty fish such as salmon and tuna are also heart healthy  Ask how much salt you can eat each day  Do not use salt substitutes  · Drink liquids as directed  You may need to limit the amount of liquids you drink if you retain fluid  Ask how much liquid to drink each day and which liquids are best for you  · Stay active  If you are not active, your symptoms are likely to worsen quickly  Walking, bicycling, and other types of physical activity help maintain your strength and improve your mood  Physical activity also helps you manage your weight  Work with your healthcare provider to create an exercise plan that is right for you  · Get vaccines as directed  Get a flu shot every year  You may also need the pneumonia vaccine  The flu and pneumonia can be severe for a person who has HF  Vaccines protect you from these infections  Join a support group:  Living with HF can be difficult  It may be helpful to talk with others who have HF  You may learn how to better manage your condition or get emotional support  For more information:   · Ethan Garcia   Phone: 4- 046 - [phone]  Web Address: https://[place]/  org   Follow up with your healthcare provider or cardiologist within 2 weeks or as directed: You may need to return for other tests  You may need home health care  A healthcare provider will monitor your vital signs, weight, and make sure your medicines are working  Write down your questions so you remember to ask them during your visits  © 2017 [address] [employer] Information is for End User's use only and may not be sold, redistributed or otherwise used for commercial purposes  All illustrations and images included in CareNotes® are the copyrighted property of [employer] , [employer]  or Adam Arzate  The above information is an  only  It is not intended as medical advice for individual conditions or treatments  Talk to your doctor, nurse or pharmacist before following any medical regimen to see if it is safe and effective for you

## 2018-03-07 NOTE — PROGRESS NOTES
"  Discussion/Summary  Normal device function      Results/Data  Cardiac Device Remote [id number] 06:23AM Jay Giraldo     Test Name Result Flag Reference   MISCELLANEOUS COMMENT      CARELINK TRANSMISSION: BATTERY VOLTAGE ADEQUATE (2 75V-RRT=2 63V)  AP>99%, BVP-100%  NO SIGNIFICANT HIGH RATE EPISODES  OPTI-VOL WITHIN NORMAL LIMITS  ALL AVAILABLE LEAD PARAMETERS WITHIN NORMAL LIMITS  NORMAL DEVICE FUNCTION  GV   Cardiac Electrophysiology Report      [id number]  pdf   DEVICE TYPE CRT-D       Cardiac Electrophysiology Report [id number] 06:23AM Jay Giraldo     Test Name Result Flag Reference   Cardiac Electrophysiology Report      [id number]     Signatures   Electronically signed by : Magda Solis RN; Nov 18 2016  2:49PM EST                       (Author)    Electronically signed by : TERESA David ; Nov 20 2016  2:46PM EST                       (Author)    "

## 2018-03-07 NOTE — PROGRESS NOTES
"  Discussion/Summary  Normal device function      Results/Data  Cardiac Device Remote [id number] 05:23AM Maryanne Thomas     Test Name Result Flag Reference   MISCELLANEOUS COMMENT (Report)     CARELINK TRANSMISSION - OPTI-VOL ONLY: OPTI-VOL WITHIN NORMAL LIMITS  NORMAL DEVICE FUNCTION  MPL4Y3S*BATTERY VOLTAGE NEARING EMILY  WILL SCHEDULE MONTHLY BATTERY CHECKS VIA CARELINK  AP 99 1% %  NO SIGNIFICANT HIGH RATE EPISODES  ALL AVAILABLE LEAD PARAMETERS WITHIN NORMAL LIMITS  *   Cardiac Electrophysiology Report      [id number] [id number]  pdf   DEVICE TYPE CRT-D       Cardiac Electrophysiology Report [id number] 05:23AM Maryanne Thomas     Test Name Result Flag Reference   Cardiac Electrophysiology Report      [id number]  pdf     Signatures   Electronically signed by : Braden Talamantes, ; Oct 11 2016  2:06PM EST                       (Author)    Electronically signed by : Capo Sheehan DO; Oct 16 2016 10:46AM EST                       (Author)    "

## 2018-03-07 NOTE — PROGRESS NOTES
"  Discussion/Summary  Normal device function      Results/Data  Cardiac Device Remote [id number] 08:37AM [employer]     Test Name Result Flag Reference   MISCELLANEOUS COMMENT (Report)     CARELINK TRANSMISSION - OPTI-VOL ONLY: OPTI-VOL FLUID THRESHOLD CROSSED AND ONGOING SINCE [address] 2015  WILL RECHECK IN 1 MONTH  TASKED TO NP  [id number]***PRESENTING RHYTHM AP/BVP @ 55 PPM  BATTERY VOLTAGE ADEQUATE 2 83V [RRT=2 63V] AP=99 2% JFO=397%  ALL AVAILABLE LEAD PARAMETERS WITHIN NORMAL LIMITS  NO SIGNIFICANT HIGH RATE EPISODES  NORMAL DEVICE FUNCTION  DL/CP   Cardiac Electrophysiology Report      [id number]  pdf   DEVICE TYPE CRT-D       Cardiac Electrophysiology Report [id number] 08:37AM [employer]     Test Name Result Flag Reference   Cardiac Electrophysiology Report      [id number]  pdf     Signatures   Electronically signed by : Arvind Aquino, ; Oct  4 2016  7:21AM EST                       (Author)    Electronically signed by : TERESA Gutierrez ; Oct  4 2016  9:18AM EST                       (Author)    "

## 2018-03-07 NOTE — PROGRESS NOTES
"  Discussion/Summary  Normal device function      Results/Data  Cardiac Device In Clinic [id number] 01:58PM [facility] [place]     Test Name Result Flag Reference   MISCELLANEOUS COMMENT (Report)     DEVICE INTERROGATED IN THE [facility] OFFICE  BATTERY VOLTAGE ADEQUATE (7 8 YRS)  AP-97%, BVP-99%  ALL LEAD PARAMETERS WITHIN NORMAL LIMITS  ALL OTHER TESTING WITHIN NORMAL LIMITS  NO SIGNIFICANT HIGH RATE EPISODES  OPTIVOL INITIALIZING  1 BENIGN VENT SENSING EPISODE  NORMAL DEVICE FUNCTION  WOUND CHECK: INCISION CLEAN AND DRY WITH EDGES APPROXIMATED; WOUND CARE AND RESTRICTIONS REVIEWED WITH PATIENT  GV   Cardiac Electrophysiology Report      [id number] [id number]  pdf   DEVICE TYPE CRT-D       Cardiac Electrophysiology Report [id number] 01:58PM [facility] [place]     Test Name Result Flag Reference   Cardiac Electrophysiology Report      [id number]  pdf     Cardiac Electrophysiology Report [id number] 01:58PM [facility] [place]     Test Name Result Flag Reference   Cardiac Electrophysiology Report      [id number]  pdf     Signatures   Electronically signed by : Yola Bauman RN; Oct 17 2017 10:08AM EST                       (Author)    Electronically signed by : TERESA Heard ; Oct 17 2017  1:02PM EST                       (Author)    "

## 2018-03-07 NOTE — PROGRESS NOTES
"  Discussion/Summary  Normal device function      Results/Data  Cardiac Device In Clinic [id number] 07:23PM [facility]     Test Name Result Flag Reference   MISCELLANEOUS COMMENT (Report)     DEVICE INTERROGATED IN THE [facility] OFFICE  PT NEW TO DEVICE CLINIC  BATTERY VOLTAGE ADEQUATE (2 83V-RRT=2 63V)  AP-57%, BVP-87% (<90% DDDR @ 55 PPM)  78 AT/AF EPISODES  AT/AF BURDEN-45 9%  HX:AF & ON ASA & AMIO  HX OF FALLS  3,437 VENT SENSING EPISODES  OPTI-VOL FLUID THRESHOLD CROSSED IN DEC & ONGOING  WILL RECHECK IN 1 MONTH  ALL LEAD PARAMETERS WITHIN NORMAL LIMITS  ALL OTHER TESTING WITHIN NORMAL LIMITS  NORMAL DEVICE FUNCTION  PT TO SEE DR ADAM TODAY  GV   Cardiac Electrophysiology Report      [id number] [id number] [id number]_16_1  pdf   DEVICE TYPE CRT-D       Cardiac Electrophysiology Report [id number] 07:23PM [facility]     Test Name Result Flag Reference   Cardiac Electrophysiology Report      [id number]  pdf     Signatures   Electronically signed by : Bishnu Agee RN; Jul 28 2016  3:50PM EST                       (Author)    Electronically signed by : TERESA Sandoval ; Jul 28 2016  3:57PM EST                       (Author)    "

## 2018-03-07 NOTE — PROGRESS NOTES
"  Discussion/Summary  Normal device function      Results/Data  Cardiac Device In Clinic [id number] 07:28PM [facility]     Test Name Result Flag Reference   MISCELLANEOUS COMMENT (Report)     DEVICE INTERROGATED IN THE [facility] OFFICE  BATTERY VOLTAGE NEARING EMILY (2 61V-RRT=2 63V BUT HAS NOT INDICATED EMILY)  WILL SCHEDULE MONTHLY BATTERY CHECKS  AP-85%, BVP-96%  ALL LEAD PARAMETERS WITHIN NORMAL LIMITS  ALL OTHER TESTING WITHIN NORMAL LIMITS  454 AT/AF EPISODES LONGEST 5 DAYS  HX: AF & ON ASA 81MG, AMIO & CARVEDILOL; NOT ON ANTICOAGULATION DUE TO HX: FALLS  2,480 BENIGN VENT SENSING EPISODES  OPTI-VOL FLUID THRESHOLD CROSSED & ONGOING  DENIES SOB, EDEMA OR WT GAIN  PT ON TORSEMIDE  WILL RECHECK IN 1 MONTH  NORMAL DEVICE FUNCTION  GV   Cardiac Electrophysiology Report      [id number] [id number] [id number]_[id number]_1  pdf   DEVICE TYPE CRT-D       Cardiac Electrophysiology Report [id number] 07:28PM [facility]     Test Name Result Flag Reference   Cardiac Electrophysiology Report      [id number]  pdf     Cardiac Electrophysiology Report [id number] 07:28PM [facility]     Test Name Result Flag Reference   Cardiac Electrophysiology Report      [id number]  pdf     Signatures   Electronically signed by : Socorro Gomez RN; Aug  3 2017  3:46PM EST                       (Author)    Electronically signed by : TERESA Schrader ; Aug  3 2017  5:32PM EST                       (Author)    "

## 2018-03-07 NOTE — PROGRESS NOTES
"  Discussion/Summary  Normal device function      Results/Data  Cardiac Device Remote [id number] 08:36AM Deniz Nixon     Test Name Result Flag Reference   MISCELLANEOUS COMMENT      [address]  ONLY: OPTIVOL WITHIN NORMAL LIMITS  AP 85% BVP 97%  ALL AVAILABLE LEAD PARAMETER WITHIN NORMAL LIMITS  143 AT/AF EPISODES DETECTED  VENTRICULAR SENSE EPISODES DETECTED  NORMAL DEVICE FUNCTION---ENG   Cardiac Electrophysiology Report      [id number] [id number]  pdf   DEVICE TYPE CRT-D       Cardiac Electrophysiology Report [id number] 08:36AM [facility] Nixon     Test Name Result Flag Reference   Cardiac Electrophysiology Report      [id number] pdf     Signatures   Electronically signed by : Jenny Anderson, ; Dec 29 2016  7:52AM EST                       (Author)    Electronically signed by : TERESA Morris ; Dec 31 2016  8:24AM EST                       (Author)    "

## 2025-07-08 NOTE — DISCHARGE INSTRUCTIONS
Please refer to post device implantation discharge instructions and restrictions below and your device booklet/temporary card  Keep incision dry for one week  Do not use lotions/powders/creams on incision  Remove outer bandage 48 hours after implantation  Leave underlying steri-strips in place, they will either fall off on their own or will be removed at 2 week follow up appointment  Please call the office if you notice redness, swelling, bleeding, or drainage from incision or if you develop fevers    Cardiac Resynchronization Therapy (CRT)    If you have any questions, please call [phone] to speak with a nurse (8:30am-4pm, or [phone] after hours)  For appointments, please call [phone]  WHAT YOU SHOULD KNOW:    Your device is a biventricular ICD, which delivers resynchronization therapy and is also a defibrillator (see below)  Cardiac resynchronization therapy (CRT) is a procedure used to treat problems with how your heart contracts  CRT is also called biventricular pacing  Your heart has 2 upper chambers, called atria, and 2 lower chambers, called ventricles  Your heartbeat is synchronized when all areas of your heart contract together properly  When the areas of your heart do not contract as they should, your heart cannot pump enough blood and oxygen to your body  You may have trouble breathing, tire easily, and have swelling in your legs and feet  With a biventricular device, there is one wire in the right atria (in most cases), one wire in the right ventricle, and a third wire that goes through a vein and wraps around to your left ventricle  The two ventricular wires work together to help your heart contract more synchronously and efficiently                AFTER YOU LEAVE:      Medicines:   · Heart medicine may be given to help your heart beat strongly and regularly  Ask your healthcare provider for more information about heart medicines  · Take your medicine as directed   Contact your You may receive a survey about your visit with us today. The feedback from our patients helps us identify what is working well and where the service to all patients can be enhanced. Thank you!    healthcare provider if you think your medicine is not helping or if you have side effects  Tell him if you are allergic to any medicine  Keep a list of the medicines, vitamins, and herbs you take  Include the amounts, and when and why you take them  Bring the list or the pill bottles to follow-up visits  Carry your medicine list with you in case of an emergency  Follow up with your healthcare provider as directed: You will need to return to have your pacemaker checked  You may also need an EKG, echocardiogram, or chest x-ray to check the leads in your heart, and your doctor will order these tests if necessary  Write down your questions so you remember to ask them during your visits  Device safety: Some electrical devices may interfere with how your pacemaker works  Some examples are cell phones, security systems, power cables, and electric monitors  Ask your healthcare provider how long you can be near these devices, and which devices to avoid  Tell caregivers you have a pacemaker before you have a procedure or surgery  Wound care: Care for your wound as directed  Keep incision dry for one week  Do not use lotions/powders/creams on incision  Remove outer bandage 48 hours after implantation  Leave underlying steri-strips in place, they will either fall off on their own or will be removed at 2 week follow up appointment  Please call the office if you notice redness, swelling, bleeding, or drainage from incision or if you develop fevers      Contact your healthcare provider if:   · You have new or increased swelling in your legs or feet  · You feel more tired than usual    · You have trouble breathing during activity  · Your wound is red, warm, swollen, or draining pus  · You have a fever  · You have questions or concerns about your condition or care  Seek care immediately or call 911 if:   · You feel like your heart is fluttering or jumping     · You have any of the following signs of a heart attack:    ¨ Squeezing, pressure, fullness, or pain in your chest that lasts longer than a few minutes or returns   ¨ Discomfort or pain in your back, neck, jaw, stomach, or arm   ¨ Shortness of breath or breathing problems   ¨ A sudden cold sweat, lightheadedness, dizziness, or nausea, especially with chest pain or trouble breathing      Implantable Cardioverter Defibrillator (ICD)    If you have any questions, please call [phone] to speak with a nurse (8:30am-4pm, or [phone] after hours)  For appointments, please call [phone]  WHAT YOU SHOULD KNOW:    Your device is a biventricular ICD, which delivers resynchronization therapy (see above) and is also a defibrillator  An implantable cardioverter defibrillator (ICD) is a small device that monitors your heart rate and rhythm  It is commonly placed inside your upper chest region  It may be used if you have a ventricular arrhythmia, which is an irregular, dangerous rhythm from the bottom chamber of your heart  Some arrhythmias may cause your heart to suddenly stop beating  An ICD can give a shock to your heart to make it start beating again, or it can give pacing therapy (also known as pain-free therapy) to return your heart to normal rhythm  It is also a pacemaker, so it will pace your heart if needed to prevent it from beating too slowly            AFTER YOU LEAVE:      Follow up with your primary healthcare provider or cardiologist as directed: You will need to follow-up to have your ICD checked and make sure you are not having problems  Write down your questions so you remember to ask them during your visits  Self-care:   · Ask about activity: Ask if you need to avoid moving your shoulder or arm, and for how long  Ask if you should avoid lifting heavy objects  Do not play any contact sports, such as football or wrestling, until your primary healthcare provider [facility] or cardiologist tells you it is okay   You may only be able to drive for a certain amount of time per day, or during certain hours  Ask when you can return to work  · Care for your skin over the ICD: Ask your PHP or cardiologist when it is okay for you to bathe  Do not put any lotion or powder on the incision area  Do not rub or wear tight clothing over the ICD area until your PHP or cardiologist tells you it is okay  When you get a shock from your ICD: A shock may feel like someone has hit you, or you may feel a thump in the chest  If someone is touching you when you get a shock, they may feel a tingling feeling  The first time you feel a shock, it may scare you  Sit or lie down and stay calm  Ask someone to stay with you if possible  Please either call your cardiologist or report to an emergency room  Safety instructions when you have an ICD:   · Carry an ID card for the ICD: This card has important information about your ICD  · Stay away from magnets or machines with electric fields: This includes MRI machines  Avoid leaning into a car engine or doing welding  These things can interfere with how your ICD works  · Tell airport security you have an ICD: You may need to be searched by hand when you go through a security gate  The security gate or handheld wand could harm your ICD  · Keep an ICD diary: Record when you get a shock and what you were doing before you got the shock  Keep track of how you felt before and after the shock, as well as how many shocks you received  Write down the day and time of each shock  Bring the diary with you when you see your PHP or cardiologist    · Carry medical alert identification: Wear medical alert jewelry or carry a card that says you have an ICD  Ask your PHP or cardiologist where to get these items  Contact your primary healthcare provider or cardiologist if:   · You have a fever  · You feel 1 or more shocks from your ICD and feel fine afterwards  · Your feet or ankles swell     · The area around your ICD is painful or tender after surgery  · The skin around your stitches or staples is red, swollen, or draining pus or fluid  · You have chills, a cough, and feel weak or achy  · You are sad or anxious and find it hard to do your usual activities  · You have questions or concerns about your condition or care  Seek care immediately or call 911 if:   · Your stitches or staples come apart  · Blood soaks through your bandage  · You feel more than 3 shocks in a row from your ICD  · You become weak, dizzy, or faint  · You feel your heart skip beats or beat very fast or slow, but you do not feel a shock from your ICD  · You have chest pain that does not go away with rest or medicine  © 2014 [address] [employer] is for End User's use only and may not be sold, redistributed or otherwise used for commercial purposes  All illustrations and images included in CareNotes® are the copyrighted property of [employer]  or Adam Arzate  The above information is an  only  It is not intended as medical advice for individual conditions or treatments  Talk to your doctor, nurse or pharmacist before following any medical regimen to see if it is safe and effective for you